# Patient Record
Sex: FEMALE | Race: WHITE | HISPANIC OR LATINO | Employment: FULL TIME | URBAN - METROPOLITAN AREA
[De-identification: names, ages, dates, MRNs, and addresses within clinical notes are randomized per-mention and may not be internally consistent; named-entity substitution may affect disease eponyms.]

---

## 2017-01-05 ENCOUNTER — GENERIC CONVERSION - ENCOUNTER (OUTPATIENT)
Dept: OTHER | Facility: OTHER | Age: 27
End: 2017-01-05

## 2017-08-07 ENCOUNTER — ALLSCRIPTS OFFICE VISIT (OUTPATIENT)
Dept: OTHER | Facility: OTHER | Age: 27
End: 2017-08-07

## 2017-08-07 DIAGNOSIS — E03.9 HYPOTHYROIDISM: ICD-10-CM

## 2017-09-05 ENCOUNTER — GENERIC CONVERSION - ENCOUNTER (OUTPATIENT)
Dept: OTHER | Facility: OTHER | Age: 27
End: 2017-09-05

## 2017-09-05 LAB
AMBIG ABBREV CMP14 DEFAULT (HISTORICAL): NORMAL
BASOPHILS # BLD AUTO: 0 %
BASOPHILS # BLD AUTO: 0 X10E3/UL (ref 0–0.2)
DEPRECATED RDW RBC AUTO: 14.1 % (ref 12.3–15.4)
EOSINOPHIL # BLD AUTO: 0.2 X10E3/UL (ref 0–0.4)
EOSINOPHIL # BLD AUTO: 2 %
HCT VFR BLD AUTO: 40.2 % (ref 34–46.6)
HGB BLD-MCNC: 13.6 G/DL (ref 11.1–15.9)
IMM.GRANULOCYTES (CD4/8) (HISTORICAL): 0 %
IMM.GRANULOCYTES (CD4/8) (HISTORICAL): 0 X10E3/UL (ref 0–0.1)
LYMPHOCYTES # BLD AUTO: 2.6 X10E3/UL (ref 0.7–3.1)
LYMPHOCYTES # BLD AUTO: 40 %
MCH RBC QN AUTO: 29.8 PG (ref 26.6–33)
MCHC RBC AUTO-ENTMCNC: 33.8 G/DL (ref 31.5–35.7)
MCV RBC AUTO: 88 FL (ref 79–97)
MONOCYTES # BLD AUTO: 0.4 X10E3/UL (ref 0.1–0.9)
MONOCYTES (HISTORICAL): 5 %
NEUTROPHILS # BLD AUTO: 3.3 X10E3/UL (ref 1.4–7)
NEUTROPHILS # BLD AUTO: 53 %
PLATELET # BLD AUTO: 260 X10E3/UL (ref 150–379)
RBC (HISTORICAL): 4.56 X10E6/UL (ref 3.77–5.28)
WBC # BLD AUTO: 6.5 X10E3/UL (ref 3.4–10.8)

## 2017-09-06 ENCOUNTER — GENERIC CONVERSION - ENCOUNTER (OUTPATIENT)
Dept: OTHER | Facility: OTHER | Age: 27
End: 2017-09-06

## 2017-09-06 LAB
A/G RATIO (HISTORICAL): 1.5 (ref 1.2–2.2)
ALBUMIN SERPL BCP-MCNC: 4.6 G/DL (ref 3.5–5.5)
ALP SERPL-CCNC: 50 IU/L (ref 39–117)
ALT SERPL W P-5'-P-CCNC: 11 IU/L (ref 0–32)
AST SERPL W P-5'-P-CCNC: 14 IU/L (ref 0–40)
BILIRUB SERPL-MCNC: 0.3 MG/DL (ref 0–1.2)
BUN SERPL-MCNC: 13 MG/DL (ref 6–20)
BUN/CREA RATIO (HISTORICAL): 13 (ref 9–23)
CALCIUM SERPL-MCNC: 9.5 MG/DL (ref 8.7–10.2)
CHLORIDE SERPL-SCNC: 100 MMOL/L (ref 96–106)
CO2 SERPL-SCNC: 23 MMOL/L (ref 18–29)
CREAT SERPL-MCNC: 0.98 MG/DL (ref 0.57–1)
EGFR AFRICAN AMERICAN (HISTORICAL): 92 ML/MIN/1.73
EGFR-AMERICAN CALC (HISTORICAL): 80 ML/MIN/1.73
GLUCOSE SERPL-MCNC: 81 MG/DL (ref 65–99)
POTASSIUM SERPL-SCNC: 4.7 MMOL/L (ref 3.5–5.2)
SODIUM SERPL-SCNC: 138 MMOL/L (ref 134–144)
TOT. GLOBULIN, SERUM (HISTORICAL): 3 G/DL (ref 1.5–4.5)
TOTAL PROTEIN (HISTORICAL): 7.6 G/DL (ref 6–8.5)
TSH SERPL DL<=0.05 MIU/L-ACNC: 16.42 UIU/ML (ref 0.45–4.5)

## 2017-10-18 DIAGNOSIS — E03.9 HYPOTHYROIDISM: ICD-10-CM

## 2018-01-04 ENCOUNTER — ALLSCRIPTS OFFICE VISIT (OUTPATIENT)
Dept: OTHER | Facility: OTHER | Age: 28
End: 2018-01-04

## 2018-01-05 LAB — PAP (HISTORICAL): NORMAL

## 2018-01-05 NOTE — PROGRESS NOTES
Assessment   1  Encounter for gynecological examination without abnormal finding (V72 31) (Z01 419)   2  Encounter for surveillance of contraceptive pills (V25 41) (Z30 41)   3  Encounter for preconception consultation (V26 49) (Z31 69)   4  Encounter for Pap smear of cervix with HPV DNA cotesting (V76 2) (Z12 4)   5  Hypothyroidism (244 9) (E03 9)   6  Sunburn due to tanning bed (692 82) (L56 8,W89  9XXA)    Plan   Encounter for gynecological examination without abnormal finding    · (B) PAP (REFLEX TO HPV PLUS WHEN ASC-US); Status: In Progress - Specimen/Data    Collected,Retrospective By Protocol Authorization;   Done: 36OWK7044   Perform:BioReference; HQF:[id number]; Last Updated Cathy Shiela; 1/4/2018 2:49:49 PM;Ordered;For:Encounter for gynecological examination without abnormal finding; Ordered By:Linda Sepulveda;  : 12/14/2017  Encounter for surveillance of contraceptive pills    · Norgestim-Eth Estrad Triphasic 0 18/0 215/0 25 MG-25 MCG Oral Tablet (Ortho    Tri-Cyclen Lo); TAKE 1 TABLET DAILY   Rx By: Ngoc Nails; Dispense: 28 Days ; #:1 X 28 Tablet Disp Pack; Refill: 0;For: Encounter for surveillance of contraceptive pills; MARY GRACE = N; Sent To: [facility]    Discussion/Summary   health maintenance visit healthy adult female the risks and benefits of cervical cancer screening were discussed Pap test with reflex HPV testing was done today cervical cancer screening is needed every three years Testing was done today for Denies STI concerns today  Breast cancer screening: monthly self breast exam was advised and breast cancer screening is not indicated  Colorectal cancer screening: colorectal cancer screening is not indicated  Screening lab work includes Thyroid function managed by PCP  Advice and education were given regarding reproductive health, contraception, sunscreen use and self skin examination  Patient discussion: discussed with the patient         1 - GENERAL CONCEPTION ADVICE methods of optimizing natural fertility  When to have intercourse & how to detect ovulation were all reviewed  They are going to start TTC in about 1 month  She was advised today to start a prenatal vitamin immediately  Expect ovulation to occur shortly after discontinuing birth control pill  - TANNING BED USE against this due to concern for skin cancer  The patient has the current Goals: Maintain health  The patent has the current Barriers: None  Patient is able to Self-Care  Possible side effects of new medications were reviewed with the patient/guardian today  The treatment plan was reviewed with the patient/guardian  The patient/guardian understands and agrees with the treatment plan       Self Referrals: Yes      Chief Complaint   Pt is here for yearly exam       History of Present Illness   HPI: Taylor Meneses is a 17yo para 1 female here today for annual GYN exam  She is on OCPs (denies ACHES) and enjoys them  Wants 1 more months' refill and then she and her  are going to start TTC a second child  Their son is 2yo ( delivered by [facility])  Works as an   Denies breast, bowel, or bladder concerns  No hx of abnormal pap smears  PMHx significant for hypothyroidism dx'd at age 25  Going to be celebrating her 5-year wedding anniversary soon in [facility]         GYN , Adult Female Emily Kelsey: The patient is being seen for a health maintenance and gynecology evaluation  The last health maintenance visit was 1 year(s) ago  Social History: Household members include spouse-- and-- 1 son(s)  She is   Work status: working full time-- and-- occupation:   The patient has never smoked cigarettes  General Health:      Lifestyle:  She exercises regularly  -- She does not use tobacco -- She consumes alcohol -- She denies drug use  Reproductive health:  she reports no menstrual problems        Menstrual history: LMP: the last menstrual period was 2017 -- she uses contraception  For contraception, she uses oral contraception pills  -- she is sexually active  She is monogamous with a male partner  -- pregnancy history: G 1P 1  Screening: cancer screening reviewed and updated  Cervical cancer screening includes a pap smear performed 3/27/2014, negative  Breast cancer screening includes a clinical breast exam performed 2016  Review of Systems   no vaginal pain,-- no vaginal discharge,-- no vaginal itching,-- no vaginal odor,-- did not miss the most recent period,-- not thinking she may be pregnant,-- periods are regular,-- regular length of periods-- and-- no dysuria  Constitutional: No fever, no chills, feels well, no tiredness, no recent weight gain or loss  Breasts: no complaints of breast pain, breast lump or nipple discharge  Gastrointestinal: no complaints of abdominal pain, no constipation, no nausea or diarrhea, no vomiting, no bloody stools  Genitourinary: no complaints of dysuria, no incontinence, no pelvic pain, no dysmenorrhea, no vaginal discharge or abnormal vaginal bleeding-- and-- as noted in HPI  ROS reviewed  Active Problems   1  Encounter for gynecological examination without abnormal finding (V72 31) (Z01 419)   2  Hypothyroidism (244 9) (E03 9)   3   Irregular menstrual cycle (626 4) (N92 6)    Past Medical History    · History of Breakthrough bleeding (626 6) (N92 1)   · History of Breech presentation (652 20) (O32 1XX0)   · History of Contraception (V25 9) (Z30 9)   · History of candidiasis (V12 09) (Z86 19)   · History of migraine (V12 49) (Z86 69)   · History of muscle pain (V13 59) (Z87 39)   · History of thyroid disease (V12 29) (Z86 39)   · History of varicella (V12 09) (Z86 19)   · History of Pregnancy related leg pain, antepartum (048 86,360 0) (O26 899,M79 609)   · History of Screening for lipoid disorders (V77 91) (Z13 220)   · History of Skin rash (782 1) (R21)   · Suspected problem with fetal growth not found (V89 04) (Z03 74)   · History of Thyroid dysfunction in pregnancy, antepartum (188 81,974 5) (T64 119,K11  9)   · History of URI, acute (465 9) (J06 9)   · History of Urinary Tract Infection (V13 02)   · History of Vaginitis (616 10) (N76 0)     The active problems and past medical history were reviewed and updated today  Surgical History    · History of Oral Surgery Tooth Extraction     The surgical history was reviewed and updated today  Family History   Mother    · Family history of hypertension (V17 49) (Z82 49)  Family History    · Family history of Anemia (V18 2)   · Family history of Arthritis (V17 7)   · Family history of Diabetes Mellitus (V18 0)   · Family history of Hypertension (V17 49)   · Family history of Renal Disease   · Family history of Reported Family History Of Heart Disease     The family history was reviewed and updated today  Social History    · Being A Social Drinker   · Caffeine Use   · Exercising Erratically   · Marital History - Currently    · Never A Smoker   · Denied: History of Never Used Drugs   · Uses Safety Equipment - Seatbelts    Current Meds    1  Levothyroxine Sodium 175 MCG Oral Tablet; Take 1 tablet daily; Therapy: 99DIN0650 to (Last Rx:54Gcz7716)  Requested for: 24Kra2718 Ordered   2  Norgestim-Eth Estrad Triphasic 0 18/0 215/0 25 MG-25 MCG Oral Tablet; sig 1 tablet     daily; Therapy: 82SBP3849 to (Evaluate:22Jan2018)  Requested for: 02Jht4491; Last     Rx:83Dzd1460 Ordered    Allergies   1  No Known Drug Allergies  2  Seasonal    Vitals    Recorded: 49YHH2425 64:51YX   Systolic 694, LUE, Sitting   Diastolic 70, LUE, Sitting   Height 5 ft 4 in   Weight 157 lb    BMI Calculated 26 95   BSA Calculated 1 76   LMP 47Iyk4963       BP and BMI appropriate for continued AMADOU use  Physical Exam        Constitutional      General appearance: No acute distress, well appearing and well nourished         Neck      Neck: Normal, supple, trachea midline, no masses  Thyroid: Normal, no thyromegaly  Pulmonary      Respiratory effort: No increased work of breathing or signs of respiratory distress  Auscultation of lungs: Clear to auscultation  Cardiovascular      Auscultation of heart: Normal rate and rhythm, normal S1 and S2, no murmurs  Genitourinary      External genitalia: Normal and no lesions appreciated  Vagina: Normal, no lesions or dryness appreciated  Urethra: Normal        Urethral meatus: Normal        Bladder: Normal, soft, non-tender and no prolapse or masses appreciated  Cervix: Normal, no palpable masses  Examination of the cervix revealed normal findings  A Pap smear was performed  Bimanual exam findings include no cervical motion tenderness  Uterus: Normal, non-tender, not enlarged, and no palpable masses  Adnexa/parametria: Normal, non-tender and no fullness or masses appreciated  Chest      Breasts: Normal and no dimpling or skin changes noted  Abdomen      Abdomen: Normal, non-tender, and no organomegaly noted  Skin      Skin and subcutaneous tissue: Abnormal  -- Sunburn on torso  Was at a tanning parlor earlier today        Psychiatric      Orientation to person, place, and time: Normal        Mood and affect: Normal        Signatures    Electronically signed by : [facility]; Jan 4 2018  3:12PM EST                       (Author)     Electronically signed by : SRINIVAS Krueger ; Jan 4 2018  3:31PM EST                       (Author)

## 2018-01-11 NOTE — MISCELLANEOUS
Message   Recorded as Task   Date: 03/30/2016 02:44 PM, Created By: Roxanne Vitale   Task Name: Med Renewal Request   Assigned To: Felix Ramirez   Regarding Patient: Yen Coley, Status: Active   CommentStanford Oliveira - 30 Mar 2016 2:44 PM     TASK CREATED  Caller: Self; Renew Medication; [phone] (Home)  please cancel prescription at [facility] to Now go to [address] [place] [address]  [place]  She had 4 refills left and needs to refill by the weekend  Please call her when the script is moved   Ledora Graft - 30 Mar 2016 3:19 PM     TASK EDITED  called pharm they will deactivate the rx, sent to new pharm        Active Problems    1  Breakthrough bleeding (626 6) (N92 1)   2  Candidiasis (112 9) (B37 9)   3  Hypothyroidism (244 9) (E03 9)   4  Irregular menstrual cycle (626 4) (N92 6)   5  Myalgia (729 1) (M79 1)    Current Meds   1  Fluconazole 150 MG Oral Tablet (Diflucan); take one tablet today and one in 3 days; Therapy: 42GXT3837 to (Liudmila Palencia)  Requested for: 66GCF6534; Last   Rx:02Mar2016 Ordered   2  Levothyroxine Sodium 150 MCG Oral Tablet; TAKE 1 TABLET DAILY; Therapy: 39WKR1047 to (Isis Price)  Requested for: 68FXW0022; Last   Rx:03Mar2016 Ordered   3  Minastrin 24 Fe 1-20 MG-MCG(24) Oral Tablet Chewable; Take 1 tablet daily; Therapy: 44XWS2746 to (Evaluate:60Kee1283)  Requested for: 11DTQ2447; Last   Rx:18Flb5771 Ordered   4  Ortho Tri-Cyclen Lo 0 18/0 215/0 25 MG-25 MCG Oral Tablet; Take 1 tablet daily; Therapy: 47NLE8249 to (Evaluate:00Bst5487)  Requested for: 82LWG0788; Last   Rx:92Ipz3717 Ordered    Allergies    1  No Known Drug Allergies    2  Seasonal    Plan  Breakthrough bleeding    · Ortho Tri-Cyclen Lo 0 18/0 215/0 25 MG-25 MCG Oral Tablet (Norgestim-Eth  Estrad Triphasic);  Take 1 tablet daily    Signatures   Electronically signed by : Theresa Hall, ; Mar 30 2016  3:20PM EST                       (Author)

## 2018-01-11 NOTE — MISCELLANEOUS
Message   Recorded as Task   Date: 03/02/2016 09:42 AM, Created By: Prachi Workman   Task Name: Call Back   Assigned To: Keri Thomas   Regarding Patient: Ulysses Pardon, Status: In Progress   Comment:    Prachi Workman - 02 Mar 2016 9:42 AM     TASK CREATED  Caller: Self; General Medical Question; [phone] (Home)  pt called - questions on the [facility] SYSTEM she's on - Ortho Tri-Cyclen - having issues on the last week of the pills - she was to get her period with the last week but hasn't gotten it yet - but she did spot - ALSO, having major itching at night (losing sleep)- not sure issue - previous had BV with dryness - it's not dry any longer but the itching is very bad  She's at work now - if she doesn't answer - she's asking that you call her right back - she will excuse herself &  on the 2nd call - Please advise  Guevara 86 Mar 2016 10:07 AM     TASK IN PROGRESS   Lita Amador - 88 Mar 2016 10:09 AM     TASK EDITED  lmtcb   Lita Amador - 37 Mar 2016 10:23 AM     TASK EDITED  Pt to stay on ocs - takes 3 mos to adjust to new pill  Pt has yeast sx - itch, dsch, no odor  To take diflucan ( to ehr) If no better in 1 week - ov        Active Problems    1  Breakthrough bleeding (626 6) (N92 1)   2  Candidiasis (112 9) (B37 9)   3  Hypothyroidism (244 9) (E03 9)   4  Irregular menstrual cycle (626 4) (N92 6)   5  Myalgia (729 1) (M79 1)    Current Meds   1  Levothyroxine Sodium 125 MCG Oral Tablet; TAKE 1 TABLET DAILY AS DIRECTED; Therapy: 46QCF6751 to (Evaluate:11Nrw1051)  Requested for: 21Jan2016; Last   Rx:21Jan2016 Ordered   2  Minastrin 24 Fe 1-20 MG-MCG(24) Oral Tablet Chewable; Take 1 tablet daily; Therapy: 70TTO5521 to (Evaluate:32Tdb9096)  Requested for: 64RGZ0393; Last   Rx:15Jun2015 Ordered   3  Ortho Tri-Cyclen Lo 0 18/0 215/0 25 MG-25 MCG Oral Tablet (Norgestim-Eth Estrad   Triphasic); Take 1 tablet daily;    Therapy: 13WHD6881 to (Evaluate:94Huu5706) Requested for: 80IDC9413; Last   Rx:08Aye1685 Ordered    Allergies    1  No Known Drug Allergies    2  Seasonal    Plan  Candidiasis    · Fluconazole 150 MG Oral Tablet (Diflucan); take one tablet today and one in 3  days    Signatures   Electronically signed by :  Martell Gonzalez, ; Mar  2 2016 10:24AM EST                       (Author)

## 2018-01-12 NOTE — MISCELLANEOUS
Message   Date: 29 Jul 2016 9:04 PM EST, Recorded By: Sirena Abdul For: Ritchie Carr, Luis A   Phone: [phone] (Home), [phone] (Work)   Reason: Medical Complaint   Answering service 7/29/2016 9:04 pm   Needs on-call for tingly fingers and hands, numbness in right side of cheek after eating homemade pickle  Plan  Patient already on her way to urgent care  Discussion/Summary  22year old female with a history of migraines spoke to me about numbness and tingling feeling in her hands/fingers  Symptoms lasted for about 15 minutes  She noticed symptoms after eating a pickle  Never had symptoms like this before  No focal weakness  No speech difficulties or swallowing difficulties  This was the first thing she ate all day since she was very busy at work and didn't get to eat  Afterwards she noticed she was starting to develop a headache in the back of her head  Her right arm was starting to feel a little bit heavy as well  She was concerned on already on her way to urgent care  I agreed that she should be further evaluation in urgent care or emergency department setting  All questions were answered       Signatures   Electronically signed by : Josesito Beyer DO; Aug  1 2016 10:29AM EST                       (Author)

## 2018-01-13 NOTE — PROGRESS NOTES
Assessment    1  Breakthrough bleeding (626 6) (N92 1)    Plan  Breakthrough bleeding    · Ortho Tri-Cyclen Lo 0 18/0 215/0 25 MG-25 MCG Oral Tablet (Norgestim-Eth Estrad  Triphasic); Take 1 tablet daily   Rx By: Jeanine Ku; Dispense: 28 Days ; #:1 X 28 Tablet Disp Pack; Refill: 5; For: Breakthrough bleeding; MARY GRACE = N; Sent To: [id number]    Discussion/Summary  Contraception: Impression: contraception management  The goals of contraception include pregnancy prevention and cycle control  Currently, the patient has symptoms that are not controlled  Medication management includes discontinuing monophasic oral contraceptives and beginning triphasic oral contraceptives  Medication changes are as documented in orders  Patient discussion: 10 minute visit, greater than half of the time was spent on counseling  Discussion Summary:   Patient complains of breakthrough bleeding and also sometimes missing her periods on the placebo  Was hoping the birth control pill control her cycle  We'll change to multiphasic pill  So the patient did get to 3 cycles before she decides if it's working  Chief Complaint  Chief Complaint Free Text Note Form: Pt is here to discus other contraceptive options  She is currently on Minastrin but her menses still are not regular  This is her second BC that she tried which has made her dry and itchy and intercourse painful  She tried Premarin in the past for this which helped  She believes that the [facility] SYSTEM is the culprit  History of Present Illness  HPI: 51-year-old here today complaining of breakthrough bleeding on the Minastrin  She denies missing pills  Active Problems    1  Contraception (V25 9) (Z30 9)   2  Encounter for routine gynecological examination (V72 31) (Z01 419)   3  Flu vaccine need (V04 81) (Z23)   4  Hypothyroidism (244 9) (E03 9)   5  Irregular menstrual cycle (626 4) (N92 6)   6  Screening for lipoid disorders (V77 91) (Z13 220)   7   Thyroid dysfunction in pregnancy, antepartum (648 13,246 9) (O99 280,E07 9)   8  URI, acute (465 9) (J06 9)    Past Medical History    1  History of Breech presentation (652 20) (O32 1XX0)   2  History of migraine (V12 49) (Z86 69)   3  History of thyroid disease (V12 29) (Z86 39)   4  History of varicella (V12 09) (Z86 19)   5  History of Maternal obesity, antepartum (649 13,278 00) (O99 210,E66 9)   6  History of Pregnancy related leg pain, antepartum (346 11,321 3) (O26 899,M79 609)   7  Suspected problem with fetal growth not found (V89 04) (Z03 74)   8  History of Urinary Tract Infection (V13 02)   9  History of Vaginitis (616 10) (N76 0)    Surgical History    1  History of Oral Surgery Tooth Extraction    Family History    1  Family history of hypertension (V17 49) (Z82 49)    2  Family history of Anemia (V18 2)   3  Family history of Arthritis (V17 7)   4  Family history of Diabetes Mellitus (V18 0)   5  Family history of Hypertension (V17 49)   6  Family history of Renal Disease   7  Family history of Reported Family History Of Heart Disease    Social History    · Being A Social Drinker   · Caffeine Use   · Exercising Erratically   · Marital History - Currently    · Never A Smoker   · Denied: History of Never Used Drugs   · Stopped Drinking Alcohol   · Uses Safety Equipment - Seatbelts    Current Meds   1  Levothyroxine Sodium 125 MCG Oral Tablet; TAKE 1 TABLET DAILY AS DIRECTED; Therapy: 77KCI2076 to (Evaluate:18Qbc1414)  Requested for: 21Jan2016; Last   Rx:21Jan2016 Ordered   2  Minastrin 24 Fe 1-20 MG-MCG(24) Oral Tablet Chewable; Take 1 tablet daily; Therapy: 01NNX0923 to (Evaluate:87Nms9104)  Requested for: 61BHI8896; Last   Rx:15Jun2015 Ordered    Allergies    1  No Known Drug Allergies    2   Seasonal    Future Appointments    Date/Time Provider Specialty Site   06/20/2016 01:00 PM Lola Camarillo MD Obstetrics/Gynecology [facility]     Signatures   Electronically signed by : Lokesh Corcoran CNM; Feb 4 2016 10:25AM EST                       (Author)    Electronically signed by : Lokesh Corcoran CNM;  Feb 4 2016 10:25AM EST                       (Author)    Electronically signed by : SRINIVAS Buckley ; Feb 4 2016  2:30PM EST

## 2018-01-14 NOTE — MISCELLANEOUS
Message   Recorded as Task   Date: 10/31/2016 02:28 PM, Created By: Geovanni Alvarado   Task Name: Care Coordination   Assigned To: Feroz Castro   Regarding Patient: Cole Guerrero, Status: In Progress   Comment:    Dennise Gomez - 31 Oct 2016 2:28 PM     TASK CREATED  Caller: Self; Care Coordination; [phone] (Mobile Phone)  Pt has a questions in regards to her birth control pills  [facility] - 31 Oct 2016 2:38 PM     TASK IN PROGRESS   [facility] - 31 Oct 2016 3:51 PM     TASK EDITED    pt on week 3 saturday-took one yesterday- 3 hours late  - did forget to take another pill during the pack as well, has had unprotected sex-on saturday  advised if pregnancy was not desired to take plan b as soon as possible and stop her bc then restart when she gets her period  Active Problems    1  Breakthrough bleeding (626 6) (N92 1)   2  Contraceptive use education (V25 09) (Z30 09)   3  Hypothyroidism (244 9) (E03 9)   4  Irregular menstrual cycle (626 4) (N92 6)   5  Myalgia (729 1) (M79 1)   6  Skin rash (782 1) (R21)    Current Meds   1  Levothyroxine Sodium 150 MCG Oral Tablet; TAKE 1 TABLET DAILY; Therapy: 83DMM4594 to (STQXUXOC:17SJI2233)  Requested for: 28Jun2016; Last   Rx:28Jun2016 Ordered   2  Norgestim-Eth Estrad Triphasic 0 18/0 215/0 25 MG-25 MCG Oral Tablet (Ortho   Tri-Cyclen Lo); sig 1 tablet daily; Therapy: 15ANZ4266 to (Last Rx:70Onk0439)  Requested for: 15Sep2016 Ordered   3  Nystatin-Triamcinolone 743656-3 1 UNIT/GM-% External Cream; APPLY SPARINGLY TO   AFFECTED AREA(S) 3 TIMES A DAY; Therapy: 96OTG4216 to (Complete:12Nov2016)  Requested for: 29Oct2016; Last   Rx:29Oct2016 Ordered    Allergies    1  No Known Drug Allergies    2   Seasonal    Signatures   Electronically signed by : Shahana Choi LPN; Oct 31 2251  1:34PR EST                       (Author)

## 2018-01-14 NOTE — MISCELLANEOUS
Message   Recorded as Task   Date: 09/15/2016 09:42 AM, Created By: Christiana Glasgow   Task Name: Med Renewal Request   Assigned To: Kandy Bear   Regarding Patient: Itz Mercado, Status: In Progress   Comment:    Dennise Gomez - 15 Sep 2016 9:42 AM     TASK CREATED  Caller: Self; Renew Medication; [phone] (Home)  Pt called and need a refill on her birth control  she can be reached@ home   [facility] - 15 Sep 2016 9:55 AM     TASK IN PROGRESS   [facility] - 15 Sep 2016 9:57 AM     TASK EDITED                 rx sent to pharmacy  Active Problems    1  Breakthrough bleeding (626 6) (N92 1)   2  Candidiasis (112 9) (B37 9)   3  Contraceptive use education (V25 09) (Z30 09)   4  Encounter for routine gynecological examination (V72 31) (Z01 419)   5  Hypothyroidism (244 9) (E03 9)   6  Irregular menstrual cycle (626 4) (N92 6)   7  Myalgia (729 1) (M79 1)    Current Meds   1  Levothyroxine Sodium 150 MCG Oral Tablet; TAKE 1 TABLET DAILY; Therapy: 36QSB3763 to (EAKPMITL:30FXO9899)  Requested for: 28Jun2016; Last   Rx:28Jun2016 Ordered   2  Norgestim-Eth Estrad Triphasic 0 18/0 215/0 25 MG-25 MCG Oral Tablet; sig 1 tablet   daily; Therapy: 89OVF2822 to (Dianne Maurice)  Requested for: 20Jun2016; Last   Rx:20Jun2016 Ordered    Allergies    1  No Known Drug Allergies    2   Seasonal    Plan  Breakthrough bleeding, Contraceptive use education    · Norgestim-Eth Estrad Triphasic 0 18/0 215/0 25 MG-25 MCG Oral Tablet (Ortho  Tri-Cyclen Lo); sig 1 tablet daily    Signatures   Electronically signed by : Nahomy Angeles LPN; Sep 15 5663  2:89RT EST                       (Author)

## 2018-01-15 VITALS
HEIGHT: 64 IN | HEART RATE: 74 BPM | SYSTOLIC BLOOD PRESSURE: 106 MMHG | DIASTOLIC BLOOD PRESSURE: 56 MMHG | BODY MASS INDEX: 27.31 KG/M2 | WEIGHT: 160 LBS | OXYGEN SATURATION: 99 %

## 2018-01-16 NOTE — RESULT NOTES
Verified Results  (1) CBC/PLT/DIFF [id number] 12:56PM Prior Knowledge     Test Name Result Flag Reference   WBC 6 5 x10E3/uL  3 4-10 8   RBC 4 56 x10E6/uL  3 77-5 28   Hemoglobin 13 6 g/dL  11 1-15 9   Hematocrit 40 2 %  34 0-46  6   MCV 88 fL  79-97   MCH 29 8 pg  26 6-33 0   MCHC 33 8 g/dL  31 5-35 7   RDW 14 1 %  12 3-15 4   Platelets 305 N84D0/DH  150-379   Neutrophils 53 %     Lymphs 40 %     Monocytes 5 %     Eos 2 %     Basos 0 %     Neutrophils (Absolute) 3 3 x10E3/uL  1 4-7 0   Lymphs (Absolute) 2 6 x10E3/uL  0 7-3 1   Monocytes(Absolute) 0 4 x10E3/uL  0 1-0 9   Eos (Absolute) 0 2 x10E3/uL  0 0-0 4   Baso (Absolute) 0 0 x10E3/uL  0 0-0 2   Immature Granulocytes 0 %     Immature Grans (Abs) 0 0 x10E3/uL  0 0-0 1     (1) COMPREHENSIVE METABOLIC PANEL [id number] 83:51VQ Prior Knowledge     Test Name Result Flag Reference   Glucose, Serum 81 mg/dL  65-99   BUN 13 mg/dL  6-20   Creatinine, Serum 0 98 mg/dL  0 57-1 00   BUN/Creatinine Ratio 13  9-23   Sodium, Serum 138 mmol/L  134-144   Potassium, Serum 4 7 mmol/L  3 5-5 2   Chloride, Serum 100 mmol/L     Carbon Dioxide, Total 23 mmol/L  18-29   Calcium, Serum 9 5 mg/dL  8 7-10 2   Protein, Total, Serum 7 6 g/dL  6 0-8 5   Albumin, Serum 4 6 g/dL  3 5-5 5   Globulin, Total 3 0 g/dL  1 5-4 5   A/G Ratio 1 5  1 2-2 2   Bilirubin, Total 0 3 mg/dL  0 0-1 2   Alkaline Phosphatase, S 50 IU/L     AST (SGOT) 14 IU/L  0-40   ALT (SGPT) 11 IU/L  0-32   eGFR If NonAfricn Am 80 mL/min/1 73  >59   eGFR If Africn Am 92 mL/min/1 73  >59     (1) TSH [id number] 12:56PM [facility]     Test Name Result Flag Reference   TSH 16 420 uIU/mL H 0 450-4 500     [facility] C[facility]14 Default [id number] 12:56PM [facility]     Test Name Result Flag Reference   Adrianna Nowak CMP14 Default Comment     A hand-written panel/profile was received from your office   In  accordance with the [facility] Ambiguous Test Code Policy dated July 7370, we have completed your order by using the closest currently  or formerly recognized AMA panel  We have assigned Comprehensive  Metabolic Panel (14), Test Code #[id number] to this request   If this  is not the testing you wished to receive on this specimen, please  contact the [address] [place] [address] Client Inquiry/Technical Services Department  to clarify the test order  We appreciate your business

## 2018-01-22 VITALS — TEMPERATURE: 97.8 F

## 2018-01-23 VITALS
DIASTOLIC BLOOD PRESSURE: 70 MMHG | WEIGHT: 157 LBS | SYSTOLIC BLOOD PRESSURE: 110 MMHG | BODY MASS INDEX: 26.8 KG/M2 | HEIGHT: 64 IN

## 2018-03-06 ENCOUNTER — OFFICE VISIT (OUTPATIENT)
Dept: OBGYN CLINIC | Facility: MEDICAL CENTER | Age: 28
End: 2018-03-06
Payer: COMMERCIAL

## 2018-03-06 VITALS — SYSTOLIC BLOOD PRESSURE: 98 MMHG | WEIGHT: 158 LBS | BODY MASS INDEX: 27.12 KG/M2 | DIASTOLIC BLOOD PRESSURE: 56 MMHG

## 2018-03-06 DIAGNOSIS — N91.2 AMENORRHEA: Primary | ICD-10-CM

## 2018-03-06 DIAGNOSIS — E03.9 HYPOTHYROIDISM, UNSPECIFIED TYPE: ICD-10-CM

## 2018-03-06 PROCEDURE — 76801 OB US < 14 WKS SINGLE FETUS: CPT | Performed by: PHYSICIAN ASSISTANT

## 2018-03-06 PROCEDURE — 99213 OFFICE O/P EST LOW 20 MIN: CPT | Performed by: PHYSICIAN ASSISTANT

## 2018-03-06 RX ORDER — LEVOTHYROXINE SODIUM 175 UG/1
1 TABLET ORAL DAILY
COMMUNITY
Start: 2014-10-06 | End: 2018-04-10 | Stop reason: SDUPTHER

## 2018-03-06 NOTE — PROGRESS NOTES
Early OB Ultrasound Procedure Note  Dario Perez  YOB: 1990    Technician: Study performed by the interpreting physician assistant    Indications:  amenorrhea     Patient's last menstrual period was 01/09/2018 (approximate)  , Patient was on continuous OCP October-January and stopped OCP 1/20/18 to try to conceive  Negative pregnancy test beginning of February, (+) HPT yesterday    Patient denies vaginal bleeding or abdominal pain    Procedure Details  Small gestational sac in fundus 0 23cm, no fetal pole or cardiac activity  Description of fetal anatomy Normal  Description of ovaries: normal  Description of uterus: normal    Findings:  Early IUP, RTO 3 weeks for repeat US at later gestational date  Given rx for TSH due to h/o hypothyroidism and patient admits hasn't been checked in a while  Call with any heavy vaginal bleeding or abdominal pain

## 2018-03-07 NOTE — PROGRESS NOTES
History of Present Illness    Revaccination   Vaccine Information: Vaccine(s) Given (names): [id number]  Spoke with patient regarding vaccine out of temperature range and risks and benefits of revaccination  Action(s): Pt will be revaccinated  Appointment scheduled: [id number] [id number]   Pt contacted and will call back  Pt called (attempt 1): [phone]  Other Information: pt  would like to discus w/  and will call us back for appt /sf  Revaccination Completed: [id number]  Active Problems    1  Breakthrough bleeding (626 6) (N92 1)   2  Contraceptive use education (V25 09) (Z30 09)   3  Encounter for routine gynecological examination (V72 31) (Z01 419)   4  Hypothyroidism (244 9) (E03 9)   5  Irregular menstrual cycle (626 4) (N92 6)   6  Myalgia (729 1) (M79 1)   7  Need for revaccination (V05 9) (Z23)   8  Skin rash (782 1) (R21)    Immunizations  Influenza --- Trena Roberts: 30-Sep-2014   Tdap --- Series1: 30-Sep-2014     Current Meds   1  Levothyroxine Sodium 150 MCG Oral Tablet; TAKE 1 TABLET DAILY   2  Norgestim-Eth Estrad Triphasic 0 18/0 215/0 25 MG-25 MCG Oral Tablet; sig 1 tablet   daily    Allergies    1  No Known Drug Allergies    2   Seasonal    Signatures   Electronically signed by : SRINIVAS James ; Jan 9 2017  6:15PM EST

## 2018-03-26 ENCOUNTER — OFFICE VISIT (OUTPATIENT)
Dept: OBGYN CLINIC | Facility: MEDICAL CENTER | Age: 28
End: 2018-03-26
Payer: COMMERCIAL

## 2018-03-26 VITALS — SYSTOLIC BLOOD PRESSURE: 112 MMHG | BODY MASS INDEX: 27.81 KG/M2 | DIASTOLIC BLOOD PRESSURE: 66 MMHG | WEIGHT: 162 LBS

## 2018-03-26 DIAGNOSIS — N91.2 AMENORRHEA: Primary | ICD-10-CM

## 2018-03-26 PROCEDURE — 76801 OB US < 14 WKS SINGLE FETUS: CPT | Performed by: PHYSICIAN ASSISTANT

## 2018-03-26 NOTE — PROGRESS NOTES
Early OB Ultrasound Procedure Note  Nicki Howard  YOB: 1990    Technician: Study performed by the interpreting physician assistant    Indications:  amenorrhea   /66 (BP Location: Right arm, Patient Position: Sitting, Cuff Size: Standard)   Wt 73 5 kg (162 lb)   LMP 01/09/2018 (Exact Date)   Breastfeeding? No   BMI 27 81 kg/m²     Patient's last menstrual period was 01/09/2018 (exact date)  , , with EGA of  10 weeks and 6   Days by LMP    Patient denies vaginal bleeding or brown discharge      Procedure Details  A gestational sac is seen containing a yolk sac and a bradshaw embryo  The embryonic crown-rump length measures 1 34 cm  and calculates to an estimated gestational age of 9 weeks and 4   days  Embryonic cardiac activity is  present  @ 156  Description of fetal anatomy Normal    Description of ovaries: normal   Description of uterus: normal    Findings:  Viable, bradshaw intrauterine pregnancy at 7 weeks,  4 days, with a final EDC of November 8, 2018 by todays US

## 2018-03-26 NOTE — ASSESSMENT & PLAN NOTE
(+) viable IUP measuring 7w4d by todays US with (+) FHR of 156   Final DARYL of 11/8/18 by todays Us   RTO 3 wks for PN interview and 5 wks for PN1

## 2018-04-09 ENCOUNTER — INITIAL PRENATAL (OUTPATIENT)
Dept: OBGYN CLINIC | Facility: MEDICAL CENTER | Age: 28
End: 2018-04-09

## 2018-04-09 ENCOUNTER — APPOINTMENT (OUTPATIENT)
Dept: LAB | Facility: MEDICAL CENTER | Age: 28
End: 2018-04-09
Payer: COMMERCIAL

## 2018-04-09 VITALS
HEIGHT: 64 IN | WEIGHT: 161.2 LBS | SYSTOLIC BLOOD PRESSURE: 110 MMHG | BODY MASS INDEX: 27.52 KG/M2 | RESPIRATION RATE: 14 BRPM | DIASTOLIC BLOOD PRESSURE: 70 MMHG

## 2018-04-09 DIAGNOSIS — E03.9 HYPOTHYROIDISM, UNSPECIFIED TYPE: ICD-10-CM

## 2018-04-09 DIAGNOSIS — Z34.91 FIRST TRIMESTER PREGNANCY: ICD-10-CM

## 2018-04-09 DIAGNOSIS — Z34.91 FIRST TRIMESTER PREGNANCY: Primary | ICD-10-CM

## 2018-04-09 PROCEDURE — 80081 OBSTETRIC PANEL INC HIV TSTG: CPT

## 2018-04-09 PROCEDURE — 84439 ASSAY OF FREE THYROXINE: CPT

## 2018-04-09 PROCEDURE — 84443 ASSAY THYROID STIM HORMONE: CPT

## 2018-04-09 PROCEDURE — 36415 COLL VENOUS BLD VENIPUNCTURE: CPT

## 2018-04-09 PROCEDURE — 81001 URINALYSIS AUTO W/SCOPE: CPT

## 2018-04-09 PROCEDURE — 87086 URINE CULTURE/COLONY COUNT: CPT

## 2018-04-09 PROCEDURE — OBC: Performed by: OBSTETRICS & GYNECOLOGY

## 2018-04-09 NOTE — PROGRESS NOTES
Patient came to the Ob Intake with her son Lawson Habermann    Patient reports that this was a planned pregnancy   Juanpablo Perez Patient was given a referral to the Maternal fetal medicine for her level ll and was given her prenatal labs and patient plans to go to do the prenatal labs after her visit today also a referral to the dentist was given to the patient   Patient reports that she is doing very well with the pregnancy and happy with the pregnancy  Patient answer the CRAFFT Screening question and score (0)    Patient has an appointment at the maternal Fetal Medicine on Friday 4/13/2018 at the [facility] at 10:30 am

## 2018-04-10 ENCOUNTER — TELEPHONE (OUTPATIENT)
Dept: OBGYN CLINIC | Facility: CLINIC | Age: 28
End: 2018-04-10

## 2018-04-10 ENCOUNTER — TELEPHONE (OUTPATIENT)
Dept: INTERNAL MEDICINE CLINIC | Facility: CLINIC | Age: 28
End: 2018-04-10

## 2018-04-10 DIAGNOSIS — E03.9 ACQUIRED HYPOTHYROIDISM: Primary | ICD-10-CM

## 2018-04-10 LAB
ABO GROUP BLD: NORMAL
BACTERIA UR CULT: NORMAL
BACTERIA UR QL AUTO: NORMAL /HPF
BASOPHILS # BLD AUTO: 0.02 THOUSANDS/ΜL (ref 0–0.1)
BASOPHILS NFR BLD AUTO: 0 % (ref 0–1)
BILIRUB UR QL STRIP: NEGATIVE
BLD GP AB SCN SERPL QL: NEGATIVE
CLARITY UR: CLEAR
COLOR UR: YELLOW
EOSINOPHIL # BLD AUTO: 0.07 THOUSAND/ΜL (ref 0–0.61)
EOSINOPHIL NFR BLD AUTO: 1 % (ref 0–6)
ERYTHROCYTE [DISTWIDTH] IN BLOOD BY AUTOMATED COUNT: 12.9 % (ref 11.6–15.1)
GLUCOSE UR STRIP-MCNC: NEGATIVE MG/DL
HBV SURFACE AG SER QL: NORMAL
HCT VFR BLD AUTO: 35.2 % (ref 34.8–46.1)
HGB BLD-MCNC: 12 G/DL (ref 11.5–15.4)
HGB UR QL STRIP.AUTO: NEGATIVE
HYALINE CASTS #/AREA URNS LPF: NORMAL /LPF
KETONES UR STRIP-MCNC: NEGATIVE MG/DL
LEUKOCYTE ESTERASE UR QL STRIP: NEGATIVE
LYMPHOCYTES # BLD AUTO: 1.93 THOUSANDS/ΜL (ref 0.6–4.47)
LYMPHOCYTES NFR BLD AUTO: 23 % (ref 14–44)
MCH RBC QN AUTO: 29.4 PG (ref 26.8–34.3)
MCHC RBC AUTO-ENTMCNC: 34.1 G/DL (ref 31.4–37.4)
MCV RBC AUTO: 86 FL (ref 82–98)
MONOCYTES # BLD AUTO: 0.56 THOUSAND/ΜL (ref 0.17–1.22)
MONOCYTES NFR BLD AUTO: 7 % (ref 4–12)
NEUTROPHILS # BLD AUTO: 5.69 THOUSANDS/ΜL (ref 1.85–7.62)
NEUTS SEG NFR BLD AUTO: 69 % (ref 43–75)
NITRITE UR QL STRIP: NEGATIVE
NON-SQ EPI CELLS URNS QL MICRO: NORMAL /HPF
NRBC BLD AUTO-RTO: 0 /100 WBCS
PH UR STRIP.AUTO: 6.5 [PH] (ref 4.5–8)
PLATELET # BLD AUTO: 250 THOUSANDS/UL (ref 149–390)
PMV BLD AUTO: 9.2 FL (ref 8.9–12.7)
PROT UR STRIP-MCNC: NEGATIVE MG/DL
RBC # BLD AUTO: 4.08 MILLION/UL (ref 3.81–5.12)
RBC #/AREA URNS AUTO: NORMAL /HPF
RH BLD: POSITIVE
RPR SER QL: NORMAL
RUBV IGG SERPL IA-ACNC: >175 IU/ML
SP GR UR STRIP.AUTO: 1.01 (ref 1–1.03)
SPECIMEN EXPIRATION DATE: NORMAL
T4 FREE SERPL-MCNC: 1.37 NG/DL (ref 0.76–1.46)
TSH SERPL DL<=0.05 MIU/L-ACNC: 0.14 UIU/ML (ref 0.36–3.74)
UROBILINOGEN UR QL STRIP.AUTO: 0.2 E.U./DL
WBC # BLD AUTO: 8.28 THOUSAND/UL (ref 4.31–10.16)
WBC #/AREA URNS AUTO: NORMAL /HPF

## 2018-04-10 RX ORDER — LEVOTHYROXINE SODIUM 0.15 MG/1
150 TABLET ORAL DAILY
Qty: 90 TABLET | Refills: 1 | Status: SHIPPED | OUTPATIENT
Start: 2018-04-10 | End: 2018-07-20 | Stop reason: ALTCHOICE

## 2018-04-10 NOTE — TELEPHONE ENCOUNTER
PT is 10 wks pregnant   She saw her GYN yesterday & had labs done    They sd her TSH is high @ 0 144     And sd to f/u w/Dr Danita Pete to see what she recommends   Also sd to ck her T  4 level    She see's [facility] GYN in [place]    So results should be in Epic

## 2018-04-11 LAB — HIV 1+2 AB+HIV1 P24 AG SERPL QL IA: NORMAL

## 2018-04-13 ENCOUNTER — ULTRASOUND (OUTPATIENT)
Dept: PERINATAL CARE | Facility: CLINIC | Age: 28
End: 2018-04-13
Payer: COMMERCIAL

## 2018-04-13 VITALS
DIASTOLIC BLOOD PRESSURE: 68 MMHG | WEIGHT: 158.4 LBS | HEART RATE: 86 BPM | SYSTOLIC BLOOD PRESSURE: 109 MMHG | BODY MASS INDEX: 27.04 KG/M2 | HEIGHT: 64 IN

## 2018-04-13 DIAGNOSIS — Z53.1 REFUSAL OF BLOOD TRANSFUSIONS AS PATIENT IS JEHOVAH'S WITNESS: ICD-10-CM

## 2018-04-13 DIAGNOSIS — E07.9 THYROID DISEASE AFFECTING PREGNANCY: ICD-10-CM

## 2018-04-13 DIAGNOSIS — Z36.87 ENCOUNTER FOR ANTENATAL SCREENING FOR UNCERTAIN DATES: Primary | ICD-10-CM

## 2018-04-13 DIAGNOSIS — Z3A.10 10 WEEKS GESTATION OF PREGNANCY: ICD-10-CM

## 2018-04-13 DIAGNOSIS — O99.280 THYROID DISEASE AFFECTING PREGNANCY: ICD-10-CM

## 2018-04-13 PROBLEM — IMO0001 REFUSAL OF BLOOD TRANSFUSIONS AS PATIENT IS JEHOVAH'S WITNESS: Status: ACTIVE | Noted: 2018-04-13

## 2018-04-13 PROCEDURE — 76801 OB US < 14 WKS SINGLE FETUS: CPT | Performed by: OBSTETRICS & GYNECOLOGY

## 2018-04-13 PROCEDURE — 99212 OFFICE O/P EST SF 10 MIN: CPT | Performed by: OBSTETRICS & GYNECOLOGY

## 2018-04-13 NOTE — PATIENT INSTRUCTIONS
Thank you for choosing [facility] for your  care today  If you have any questions about your ultrasound or care, please do not hesitate to contact us or your primary obstetrician  Please return in 9-10 weeks for your next ultrasound to check on the fetal anatomy  If you change your mind about wanting genetic screening or testing please let us know

## 2018-04-13 NOTE — PROGRESS NOTES
[address]: Ms Alison Del Toro was seen today at 10w2d for dating ultrasound  See ultrasound report under "OB Procedures" tab  Please don't hesitate to contact our office with any concerns or questions    Brynn Hernandez MD

## 2018-04-25 ENCOUNTER — ROUTINE PRENATAL (OUTPATIENT)
Dept: OBGYN CLINIC | Age: 28
End: 2018-04-25

## 2018-04-25 VITALS — DIASTOLIC BLOOD PRESSURE: 58 MMHG | WEIGHT: 160 LBS | SYSTOLIC BLOOD PRESSURE: 140 MMHG | BODY MASS INDEX: 27.46 KG/M2

## 2018-04-25 DIAGNOSIS — Z34.91 FIRST TRIMESTER PREGNANCY: ICD-10-CM

## 2018-04-25 DIAGNOSIS — Z34.81 PRENATAL CARE, SUBSEQUENT PREGNANCY, FIRST TRIMESTER: Primary | ICD-10-CM

## 2018-04-25 LAB — EXTERNAL HIV-1 ANTIBODY: NORMAL

## 2018-04-25 PROCEDURE — 87591 N.GONORRHOEAE DNA AMP PROB: CPT | Performed by: OBSTETRICS & GYNECOLOGY

## 2018-04-25 PROCEDURE — 87491 CHLMYD TRACH DNA AMP PROBE: CPT | Performed by: OBSTETRICS & GYNECOLOGY

## 2018-04-25 PROCEDURE — PNV: Performed by: OBSTETRICS & GYNECOLOGY

## 2018-04-25 NOTE — PROGRESS NOTES
No complaints  Seen at  Center for dating ultrasound,  Patient is not for in genetic testing she not for sequential screen or quad screen   her  Prenatal labs were normal,  Patient will have every 3 months thyroid testing    Teri Acevedo is here for her first prenatal visit  Age: 32 y o  LMP: Patient's last menstrual period was 2018 (exact date)  Gestational age 13w0d based on LMP No, early [address] Yes    2  [address]         Previous C Section: No  She denies nausea and vomiting  She has had no vaginal bleeding  Patients has no complaints  She  is not planning consultation with maternal fetal medicine for a sequential screen and genetic screening  Allergies: Patient has no known allergies  Medication use :   Current Outpatient Prescriptions   Medication Sig Dispense Refill    levothyroxine 150 mcg tablet Take 1 tablet (150 mcg total) by mouth daily 90 tablet 1    Prenatal MV-Min-Fe Fum-FA-DHA (PRENATAL+DHA PO) Take by mouth       No current facility-administered medications for this visit  She is a non-smoker  In this pregnancy her  medical history is significant for hypothyroidism    Her obstetrical, medical, surgical and family history was reviewed  Her physical exam was normal  A Pap smear was not obtained, Gonorrhea and Chlamydia testing was obtained    Discussed as well during this visit was diet, prenatal vitamins, prenatal visits, lab testing, breast feeding, vaccinations, maternal fetal medicine consultations, prevention of exposure to infectious diseases and toxic chemicals, lifestyle      Risk factors for this pregnancy include: hypothyroidism, Jehowei Witness

## 2018-04-26 LAB
CHLAMYDIA DNA CVX QL NAA+PROBE: NORMAL
N GONORRHOEA DNA GENITAL QL NAA+PROBE: NORMAL

## 2018-06-19 ENCOUNTER — ROUTINE PRENATAL (OUTPATIENT)
Dept: PERINATAL CARE | Facility: MEDICAL CENTER | Age: 28
End: 2018-06-19
Payer: COMMERCIAL

## 2018-06-19 ENCOUNTER — ROUTINE PRENATAL (OUTPATIENT)
Dept: OBGYN CLINIC | Facility: MEDICAL CENTER | Age: 28
End: 2018-06-19

## 2018-06-19 VITALS
HEART RATE: 94 BPM | WEIGHT: 169 LBS | HEIGHT: 64 IN | BODY MASS INDEX: 28.85 KG/M2 | DIASTOLIC BLOOD PRESSURE: 77 MMHG | SYSTOLIC BLOOD PRESSURE: 115 MMHG

## 2018-06-19 VITALS — WEIGHT: 170.8 LBS | SYSTOLIC BLOOD PRESSURE: 124 MMHG | DIASTOLIC BLOOD PRESSURE: 66 MMHG | BODY MASS INDEX: 29.32 KG/M2

## 2018-06-19 DIAGNOSIS — Z53.1 REFUSAL OF BLOOD TRANSFUSIONS AS PATIENT IS JEHOVAH'S WITNESS: ICD-10-CM

## 2018-06-19 DIAGNOSIS — O99.280 THYROID DYSFUNCTION IN PREGNANCY, ANTEPARTUM: Primary | ICD-10-CM

## 2018-06-19 DIAGNOSIS — Z3A.19 19 WEEKS GESTATION OF PREGNANCY: ICD-10-CM

## 2018-06-19 DIAGNOSIS — E07.9 THYROID DYSFUNCTION IN PREGNANCY, ANTEPARTUM: Primary | ICD-10-CM

## 2018-06-19 DIAGNOSIS — Z34.82 PRENATAL CARE, SUBSEQUENT PREGNANCY, SECOND TRIMESTER: Primary | ICD-10-CM

## 2018-06-19 DIAGNOSIS — Z36.86 ENCOUNTER FOR ANTENATAL SCREENING FOR CERVICAL LENGTH: ICD-10-CM

## 2018-06-19 PROBLEM — Z34.81 PRENATAL CARE, SUBSEQUENT PREGNANCY, FIRST TRIMESTER: Status: RESOLVED | Noted: 2018-04-25 | Resolved: 2018-06-19

## 2018-06-19 PROCEDURE — 76817 TRANSVAGINAL US OBSTETRIC: CPT | Performed by: OBSTETRICS & GYNECOLOGY

## 2018-06-19 PROCEDURE — 76805 OB US >/= 14 WKS SNGL FETUS: CPT | Performed by: OBSTETRICS & GYNECOLOGY

## 2018-06-19 PROCEDURE — PNV: Performed by: OBSTETRICS & GYNECOLOGY

## 2018-06-19 NOTE — PROGRESS NOTES
A transvaginal ultrasound was performed  Sonographer note on use of High Level Disinfection Process (Trophon) for transvaginal probe# 1 used, serial [id number]    [address] Liang Branch

## 2018-06-19 NOTE — ASSESSMENT & PLAN NOTE
Having a level 2 ultrasound later today   no new complaints   patient has normal fetal movements   due for her next thyroid testing in 1 month

## 2018-06-19 NOTE — PROGRESS NOTES
Problem List Items Addressed This Visit        Other    Prenatal care, subsequent pregnancy, second trimester - Primary      Having a level 2 ultrasound later today   no new complaints   patient has normal fetal movements   due for her next thyroid testing in 1 month

## 2018-07-19 ENCOUNTER — LAB (OUTPATIENT)
Dept: LAB | Facility: MEDICAL CENTER | Age: 28
End: 2018-07-19
Payer: COMMERCIAL

## 2018-07-19 ENCOUNTER — ROUTINE PRENATAL (OUTPATIENT)
Dept: OBGYN CLINIC | Facility: MEDICAL CENTER | Age: 28
End: 2018-07-19

## 2018-07-19 VITALS — SYSTOLIC BLOOD PRESSURE: 120 MMHG | BODY MASS INDEX: 30.04 KG/M2 | WEIGHT: 175 LBS | DIASTOLIC BLOOD PRESSURE: 62 MMHG

## 2018-07-19 DIAGNOSIS — E07.9 THYROID DYSFUNCTION IN PREGNANCY, ANTEPARTUM: ICD-10-CM

## 2018-07-19 DIAGNOSIS — O99.280 THYROID DYSFUNCTION IN PREGNANCY, ANTEPARTUM: ICD-10-CM

## 2018-07-19 DIAGNOSIS — Z34.82 PRENATAL CARE, SUBSEQUENT PREGNANCY, SECOND TRIMESTER: ICD-10-CM

## 2018-07-19 DIAGNOSIS — Z34.82 PRENATAL CARE, SUBSEQUENT PREGNANCY, SECOND TRIMESTER: Primary | ICD-10-CM

## 2018-07-19 DIAGNOSIS — Z3A.24 24 WEEKS GESTATION OF PREGNANCY: ICD-10-CM

## 2018-07-19 PROBLEM — N91.2 AMENORRHEA: Status: RESOLVED | Noted: 2018-03-06 | Resolved: 2018-07-19

## 2018-07-19 LAB
BASOPHILS # BLD AUTO: 0.04 THOUSANDS/ΜL (ref 0–0.1)
BASOPHILS NFR BLD AUTO: 0 % (ref 0–1)
EOSINOPHIL # BLD AUTO: 0.1 THOUSAND/ΜL (ref 0–0.61)
EOSINOPHIL NFR BLD AUTO: 1 % (ref 0–6)
ERYTHROCYTE [DISTWIDTH] IN BLOOD BY AUTOMATED COUNT: 13.2 % (ref 11.6–15.1)
GLUCOSE 1H P 50 G GLC PO SERPL-MCNC: 89 MG/DL
HCT VFR BLD AUTO: 34.3 % (ref 34.8–46.1)
HGB BLD-MCNC: 11.1 G/DL (ref 11.5–15.4)
IMM GRANULOCYTES # BLD AUTO: 0.04 THOUSAND/UL (ref 0–0.2)
IMM GRANULOCYTES NFR BLD AUTO: 0 % (ref 0–2)
LYMPHOCYTES # BLD AUTO: 2.09 THOUSANDS/ΜL (ref 0.6–4.47)
LYMPHOCYTES NFR BLD AUTO: 21 % (ref 14–44)
MCH RBC QN AUTO: 30.3 PG (ref 26.8–34.3)
MCHC RBC AUTO-ENTMCNC: 32.4 G/DL (ref 31.4–37.4)
MCV RBC AUTO: 94 FL (ref 82–98)
MONOCYTES # BLD AUTO: 0.54 THOUSAND/ΜL (ref 0.17–1.22)
MONOCYTES NFR BLD AUTO: 6 % (ref 4–12)
NEUTROPHILS # BLD AUTO: 7.06 THOUSANDS/ΜL (ref 1.85–7.62)
NEUTS SEG NFR BLD AUTO: 72 % (ref 43–75)
NRBC BLD AUTO-RTO: 0 /100 WBCS
PLATELET # BLD AUTO: 236 THOUSANDS/UL (ref 149–390)
PMV BLD AUTO: 10.2 FL (ref 8.9–12.7)
RBC # BLD AUTO: 3.66 MILLION/UL (ref 3.81–5.12)
TSH SERPL DL<=0.05 MIU/L-ACNC: 3.29 UIU/ML (ref 0.36–3.74)
WBC # BLD AUTO: 9.87 THOUSAND/UL (ref 4.31–10.16)

## 2018-07-19 PROCEDURE — 82950 GLUCOSE TEST: CPT

## 2018-07-19 PROCEDURE — 85025 COMPLETE CBC W/AUTO DIFF WBC: CPT

## 2018-07-19 PROCEDURE — 84443 ASSAY THYROID STIM HORMONE: CPT

## 2018-07-19 PROCEDURE — 86592 SYPHILIS TEST NON-TREP QUAL: CPT

## 2018-07-19 PROCEDURE — 36415 COLL VENOUS BLD VENIPUNCTURE: CPT

## 2018-07-19 PROCEDURE — PNV: Performed by: NURSE PRACTITIONER

## 2018-07-19 NOTE — PROGRESS NOTES
Problem List Items Addressed This Visit     Prenatal care, subsequent pregnancy, second trimester - Primary     Doing well  Denies LOF, VB, CTX  Good FM  It's a [facility]  Relevant Orders    CBC and differential    Glucose, 1H PG    RPR    TSH, 3rd generation with Free T4 reflex    Thyroid dysfunction in pregnancy, antepartum     Never had TSH drawn in early June as instructed by MFM  Given slip today to have drawn with 24-28 week labs  Relevant Orders    CBC and differential    Glucose, 1H PG    RPR    TSH, 3rd generation with Free T4 reflex    24 weeks gestation of pregnancy     Return in 4 weeks               Vikash Branch

## 2018-07-19 NOTE — ASSESSMENT & PLAN NOTE
Never had TSH drawn in early June as instructed by SRINIVAS  Given slip today to have drawn with 24-28 week labs

## 2018-07-20 ENCOUNTER — TELEPHONE (OUTPATIENT)
Dept: OBGYN CLINIC | Facility: CLINIC | Age: 28
End: 2018-07-20

## 2018-07-20 DIAGNOSIS — E03.9 HYPOTHYROIDISM, UNSPECIFIED TYPE: Primary | ICD-10-CM

## 2018-07-20 LAB — RPR SER QL: NORMAL

## 2018-07-20 RX ORDER — LEVOTHYROXINE SODIUM 175 UG/1
175 TABLET ORAL DAILY
Qty: 30 TABLET | Refills: 1 | Status: SHIPPED | OUTPATIENT
Start: 2018-07-20 | End: 2018-08-21 | Stop reason: SDUPTHER

## 2018-07-20 NOTE — PROGRESS NOTES
This OB patient needs an adjustment in her synthroid dose, she currently takes 150mcg - please increase to 175mcg for goal of <3 TSH level and recheck in 4 weeks

## 2018-07-20 NOTE — TELEPHONE ENCOUNTER
----- Message from Warren WhereNet sent at 7/20/2018  7:00 AM EDT -----  This OB patient needs an adjustment in her synthroid dose, she currently takes 150mcg - please increase to 175mcg for goal of <3 TSH level and recheck in 4 weeks

## 2018-08-16 ENCOUNTER — ROUTINE PRENATAL (OUTPATIENT)
Dept: OBGYN CLINIC | Facility: MEDICAL CENTER | Age: 28
End: 2018-08-16

## 2018-08-16 ENCOUNTER — APPOINTMENT (OUTPATIENT)
Dept: LAB | Facility: MEDICAL CENTER | Age: 28
End: 2018-08-16
Payer: COMMERCIAL

## 2018-08-16 VITALS
DIASTOLIC BLOOD PRESSURE: 68 MMHG | WEIGHT: 184 LBS | HEIGHT: 64 IN | BODY MASS INDEX: 31.41 KG/M2 | RESPIRATION RATE: 14 BRPM | SYSTOLIC BLOOD PRESSURE: 110 MMHG

## 2018-08-16 DIAGNOSIS — E03.9 HYPOTHYROIDISM: ICD-10-CM

## 2018-08-16 DIAGNOSIS — O99.280 THYROID DYSFUNCTION IN PREGNANCY, ANTEPARTUM: ICD-10-CM

## 2018-08-16 DIAGNOSIS — E07.9 THYROID DYSFUNCTION IN PREGNANCY, ANTEPARTUM: ICD-10-CM

## 2018-08-16 DIAGNOSIS — E03.9 ACQUIRED HYPOTHYROIDISM: ICD-10-CM

## 2018-08-16 DIAGNOSIS — Z3A.28 28 WEEKS GESTATION OF PREGNANCY: Primary | ICD-10-CM

## 2018-08-16 LAB
T3FREE SERPL-MCNC: 1.98 PG/ML (ref 2.3–4.2)
T4 FREE SERPL-MCNC: 1.28 NG/DL (ref 0.76–1.46)
TSH SERPL DL<=0.05 MIU/L-ACNC: 1.51 UIU/ML (ref 0.36–3.74)

## 2018-08-16 PROCEDURE — 84443 ASSAY THYROID STIM HORMONE: CPT

## 2018-08-16 PROCEDURE — PNV: Performed by: OBSTETRICS & GYNECOLOGY

## 2018-08-16 PROCEDURE — 36415 COLL VENOUS BLD VENIPUNCTURE: CPT

## 2018-08-16 PROCEDURE — 84439 ASSAY OF FREE THYROXINE: CPT

## 2018-08-16 PROCEDURE — 84481 FREE ASSAY (FT-3): CPT

## 2018-08-16 NOTE — PROGRESS NOTES
Patient is a 44-year-old female, P1, at 28 weeks 1 day here for routine prenatal visit without complaint  Pregnancy complicated by hypothyroidism  Patient is a Jehovah Witness    Review of systems is positive for fetal movement negative for loss of fluid vaginal bleeding or regular contractions

## 2018-08-21 ENCOUNTER — TELEPHONE (OUTPATIENT)
Dept: OBGYN CLINIC | Facility: MEDICAL CENTER | Age: 28
End: 2018-08-21

## 2018-08-21 DIAGNOSIS — E03.9 HYPOTHYROIDISM, UNSPECIFIED TYPE: Primary | ICD-10-CM

## 2018-08-21 DIAGNOSIS — E03.9 HYPOTHYROIDISM, UNSPECIFIED TYPE: ICD-10-CM

## 2018-08-21 RX ORDER — LEVOTHYROXINE SODIUM 175 UG/1
175 TABLET ORAL DAILY
Qty: 30 TABLET | Refills: 0 | Status: SHIPPED | OUTPATIENT
Start: 2018-08-21 | End: 2018-09-21 | Stop reason: SDUPTHER

## 2018-08-21 RX ORDER — LEVOTHYROXINE SODIUM 175 UG/1
175 TABLET ORAL DAILY
Qty: 30 TABLET | Refills: 1 | Status: CANCELLED | OUTPATIENT
Start: 2018-08-21 | End: 2018-09-20

## 2018-08-21 NOTE — TELEPHONE ENCOUNTER
Pt called and wanted to know if she should continue taking her thyroid medication  She states she had completed lab work on the 08/16 to retest for it will like to know the results  She also mentioned that her medication was just recently increased and she no longer has anymore and continued back with the lower dosage as of today

## 2018-08-21 NOTE — TELEPHONE ENCOUNTER
Dear Dr Blake Barney:    Pt called office today, wants to know if she should continue taking thyroid medication  Pt also requests recent result of TSH testing completed on 8/16/18  Pt was previously prescribed Levothyroxine 175 mcg tablet 30 tablets 1 refill on  7/20/18 by Apple Segovia  Pt states she ran out of said medication and is currently taking Levothyroxine 150 mcg as previously prescribed by PCP  Please advise as to what is the next step pending your review and interpretation of recent TSH result  Thank you!     Vitaliy Genao MA

## 2018-08-21 NOTE — TELEPHONE ENCOUNTER
Spoke with Pt today via phone call  Pt informed that her recent TSH level was within normal limits (TSH was 1 510) per Dr Chester Pruitt review  Pt advised to continue taking Levothyroxine 175 mcg medication and have repeat TSH test again in 3 months per Dr Chester Pruitt recommendation  TSH order completed in EPIC, copy of said order mailed to Pt's mailing address in EHR per Pt's request   Pt further informed that Rx for Levothyroxine 175 mcg tablet (Take 1 tablet PO daily for 30 days 1 refill) was electronically forwarded to Pt's pharmacy in EHR pending Dr Radha Vasquez  "Patient Call" routed to Dr Salvador Martines for Rx signature

## 2018-08-21 NOTE — TELEPHONE ENCOUNTER
8/16/18 TSH was 1 510  This result is right where we want it  I would recommend staying on 175 and testing again in 3 months

## 2018-08-23 ENCOUNTER — TELEPHONE (OUTPATIENT)
Dept: OBGYN CLINIC | Facility: CLINIC | Age: 28
End: 2018-08-23

## 2018-08-23 NOTE — TELEPHONE ENCOUNTER
Pt usually gets 3 mo supply - cheaper than 1 mo  I called in levothyroxine 175 mcg, sig 1 od, no refills  Put rx "on file" as pt does not need it presently  She is aware she might have 1 mo extra pills but is much cheaper  Pt will go back for tsh in 3 mos   Script on chart

## 2018-08-23 NOTE — TELEPHONE ENCOUNTER
Patient left  - stated she picked up her thyroid medication and it was for 1 month - she usually gets a 90 day supply  Wants to know why it was for only 1 month with no refills

## 2018-08-27 ENCOUNTER — ROUTINE PRENATAL (OUTPATIENT)
Dept: OBGYN CLINIC | Age: 28
End: 2018-08-27

## 2018-08-27 VITALS — SYSTOLIC BLOOD PRESSURE: 120 MMHG | WEIGHT: 185 LBS | DIASTOLIC BLOOD PRESSURE: 62 MMHG | BODY MASS INDEX: 31.76 KG/M2

## 2018-08-27 DIAGNOSIS — O99.280 THYROID DYSFUNCTION IN PREGNANCY, ANTEPARTUM: ICD-10-CM

## 2018-08-27 DIAGNOSIS — Z34.83 ENCOUNTER FOR SUPERVISION OF OTHER NORMAL PREGNANCY IN THIRD TRIMESTER: Primary | ICD-10-CM

## 2018-08-27 DIAGNOSIS — Z3A.29 29 WEEKS GESTATION OF PREGNANCY: ICD-10-CM

## 2018-08-27 DIAGNOSIS — E07.9 THYROID DYSFUNCTION IN PREGNANCY, ANTEPARTUM: ICD-10-CM

## 2018-08-27 DIAGNOSIS — Z53.1 REFUSAL OF BLOOD TRANSFUSIONS AS PATIENT IS JEHOVAH'S WITNESS: ICD-10-CM

## 2018-08-27 PROBLEM — Z34.90 SUPERVISION OF NORMAL PREGNANCY: Status: ACTIVE | Noted: 2018-08-27

## 2018-08-27 PROCEDURE — PNV: Performed by: NURSE PRACTITIONER

## 2018-08-27 NOTE — PROGRESS NOTES
Problem List Items Addressed This Visit     Refusal of blood transfusions as patient is Hoahaoism    Thyroid dysfunction in pregnancy, antepartum    29 weeks gestation of pregnancy    Supervision of normal pregnancy - Primary        Feels well  Denies LOF/CTX/VB  No concerns  Discussed fetal kick counting  Will think about TDAP for next visit  FG 32 wks on 9/11  Just upped her levothyroxine-will recheck in 4 wks

## 2018-08-27 NOTE — PATIENT INSTRUCTIONS
Pregnancy at 31 to 34 [address]:   You may continue to have symptoms such as shortness of breath, heartburn, contractions, or swelling of your ankles and feet  You may be gaining about 1 pound a week now  DISCHARGE INSTRUCTIONS:   Return to the emergency department if:   · You develop a severe headache that does not go away  · You have new or increased vision changes, such as blurred or spotted vision  · You have new or increased swelling in your face or hands  · You have vaginal spotting or bleeding  · Your water broke or you feel warm water gushing or trickling from your vagina  Contact your healthcare provider if:   · You have more than 5 contractions in 1 hour  · You notice any changes in your baby's movements  · You have abdominal cramps, pressure, or tightening  · You have a change in vaginal discharge  · You have chills or a fever  · You have vaginal itching, burning, or pain  · You have yellow, green, white, or foul-smelling vaginal discharge  · You have pain or burning when you urinate, less urine than usual, or pink or bloody urine  · You have questions or concerns about your condition or care  How to care for yourself at this stage of your pregnancy:   · Eat a variety of healthy foods  Healthy foods include fruits, vegetables, whole-grain breads, low-fat dairy foods, beans, lean meats, and fish  Drink liquids as directed  Ask how much liquid to drink each day and which liquids are best for you  Limit caffeine to less than 200 milligrams each day  Limit your intake of fish to 2 servings each week  Choose fish low in mercury such as canned light tuna, shrimp, salmon, cod, or tilapia  Do not  eat fish high in mercury such as swordfish, tilefish, radha mackerel, and shark  · Manage heartburn  by eating 4 or 5 small meals each day instead of large meals  Avoid spicy food  · Manage swelling  by lying down and putting your feet up       · Take prenatal vitamins as directed  Your need for certain vitamins and minerals, such as folic acid, increases during pregnancy  Prenatal vitamins provide some of the extra vitamins and minerals you need  Prenatal vitamins may also help to decrease the risk of certain birth defects  · Talk to your healthcare provider about exercise  Moderate exercise can help you stay fit  Your healthcare provider will help you plan an exercise program that is safe for you during pregnancy  · Do not smoke  If you smoke, it is never too late to quit  Smoking increases your risk of a miscarriage and other health problems during your pregnancy  Smoking can cause your baby to be born too early or weigh less at birth  Ask your healthcare provider for information if you need help quitting  · Do not drink alcohol  Alcohol passes from your body to your baby through the placenta  It can affect your baby's brain development and cause fetal alcohol syndrome (FAS)  FAS is a group of conditions that causes mental, behavior, and growth problems  · Talk to your healthcare provider before you take any medicines  Many medicines may harm your baby if you take them when you are pregnant  Do not take any medicines, vitamins, herbs, or supplements without first talking to your healthcare provider  Never use illegal or street drugs (such as marijuana or cocaine) while you are pregnant  Safety tips during pregnancy:   · Avoid hot tubs and saunas  Do not use a hot tub or sauna while you are pregnant, especially during your first trimester  Hot tubs and saunas may raise your baby's temperature and increase the risk of birth defects  · Avoid toxoplasmosis  This is an infection caused by eating raw meat or being around infected cat feces  It can cause birth defects, miscarriages, and other problems  Wash your hands after you touch raw meat  Make sure any meat is well-cooked before you eat it  Avoid raw eggs and unpasteurized milk   Use gloves or ask someone else to clean your cat's litter box while you are pregnant  Changes that are happening with your baby:  By 34 weeks, your baby may weigh more than 5 pounds  Your baby will be about 12 ½ inches long from the top of the head to the rump (baby's bottom)  Your baby is gaining about ½ pound a week  Your baby's eyes open and close now  Your baby's kicks and movements are more forceful at this time  What you need to know about prenatal care: Your healthcare provider will check your blood pressure and weight  You may also need the following:  · A urine test  may also be done to check for sugar and protein  These can be signs of gestational diabetes or infection  Protein in your urine may also be a sign of preeclampsia  Preeclampsia is a condition that can develop during week 20 or later of your pregnancy  It causes high blood pressure, and it can cause problems with your kidneys and other organs  · A Tdap vaccine  may be recommended by your healthcare provider  · Fundal height  is a measurement of your uterus to check your baby's growth  This number is usually the same as the number of weeks that you have been pregnant  Your healthcare provider may also check your baby's position  · Your baby's heart rate  will be checked  © 2017 [address]  Information is for End User's use only and may not be sold, redistributed or otherwise used for commercial purposes  All illustrations and images included in CareNotes® are the copyrighted property of [employer]  or [employer]  The above information is an  only  It is not intended as medical advice for individual conditions or treatments  Talk to your doctor, nurse or pharmacist before following any medical regimen to see if it is safe and effective for you

## 2018-09-11 ENCOUNTER — ULTRASOUND (OUTPATIENT)
Dept: PERINATAL CARE | Facility: MEDICAL CENTER | Age: 28
End: 2018-09-11
Payer: COMMERCIAL

## 2018-09-11 ENCOUNTER — ROUTINE PRENATAL (OUTPATIENT)
Dept: OBGYN CLINIC | Facility: MEDICAL CENTER | Age: 28
End: 2018-09-11
Payer: COMMERCIAL

## 2018-09-11 VITALS — BODY MASS INDEX: 32.96 KG/M2 | WEIGHT: 192 LBS | SYSTOLIC BLOOD PRESSURE: 116 MMHG | DIASTOLIC BLOOD PRESSURE: 70 MMHG

## 2018-09-11 VITALS
SYSTOLIC BLOOD PRESSURE: 114 MMHG | BODY MASS INDEX: 32.44 KG/M2 | WEIGHT: 190 LBS | HEART RATE: 94 BPM | HEIGHT: 64 IN | DIASTOLIC BLOOD PRESSURE: 69 MMHG

## 2018-09-11 DIAGNOSIS — IMO0002 EVALUATE ANATOMY NOT SEEN ON PRIOR SONOGRAM: ICD-10-CM

## 2018-09-11 DIAGNOSIS — Z3A.31 31 WEEKS GESTATION OF PREGNANCY: Primary | ICD-10-CM

## 2018-09-11 DIAGNOSIS — Z34.82 PRENATAL CARE, SUBSEQUENT PREGNANCY, SECOND TRIMESTER: ICD-10-CM

## 2018-09-11 DIAGNOSIS — O99.280 THYROID DYSFUNCTION IN PREGNANCY, ANTEPARTUM: ICD-10-CM

## 2018-09-11 DIAGNOSIS — Z36.89 ENCOUNTER FOR ULTRASOUND TO CHECK FETAL GROWTH: ICD-10-CM

## 2018-09-11 DIAGNOSIS — E07.9 THYROID DYSFUNCTION IN PREGNANCY, ANTEPARTUM: ICD-10-CM

## 2018-09-11 DIAGNOSIS — Z23 NEED FOR TDAP VACCINATION: Primary | ICD-10-CM

## 2018-09-11 PROCEDURE — 90471 IMMUNIZATION ADMIN: CPT

## 2018-09-11 PROCEDURE — PNV: Performed by: PHYSICIAN ASSISTANT

## 2018-09-11 PROCEDURE — 99213 OFFICE O/P EST LOW 20 MIN: CPT | Performed by: OBSTETRICS & GYNECOLOGY

## 2018-09-11 PROCEDURE — 90715 TDAP VACCINE 7 YRS/> IM: CPT

## 2018-09-11 PROCEDURE — 76816 OB US FOLLOW-UP PER FETUS: CPT | Performed by: OBSTETRICS & GYNECOLOGY

## 2018-09-11 RX ORDER — FERROUS SULFATE TAB EC 324 MG (65 MG FE EQUIVALENT) 324 (65 FE) MG
324 TABLET DELAYED RESPONSE ORAL DAILY
Qty: 90 TABLET | Refills: 3 | Status: SHIPPED | OUTPATIENT
Start: 2018-09-11 | End: 2019-02-07

## 2018-09-11 NOTE — PROGRESS NOTES
Patient w/o complaints  (+) good fetal movement, denies any bleeding, fluid leakage or ctx  Baby breech position for f/u US  Pt wants version if needed, she had with first son and was successful and had vaginal delivery      Problem List Items Addressed This Visit     Prenatal care, subsequent pregnancy, second trimester     RTO 2 wks  tdap given today  Would want to attempt version if baby still breech later on  Reviewed PTL precautions, fetal kick counts and reasons to call           Other Visit Diagnoses     Need for Tdap vaccination    -  Primary    Relevant Orders    TDAP Vaccine greater than or equal to 6yo (Completed)

## 2018-09-11 NOTE — PROGRESS NOTES
[address] [facility] [place]: Ms Jacqueline Toribio was seen today at [address] for fetal growth and followup missed anatomy ultrasound  See ultrasound report under "OB Procedures" tab  Please don't hesitate to contact our office with any concerns or questions    Vashti Coy MD

## 2018-09-11 NOTE — PATIENT INSTRUCTIONS
I recommend repeating your thyroid tests 4 weeks from August 21, 2018  At that time I recommend getting iron studies and a repeat complete blood count as well  We at that time can assess the efficacy of your oral iron supplementation and also assess the need for any IV iron infusions  One medication that can be used to prevent postpartum hemorrhage is tranexamic acid

## 2018-09-11 NOTE — ASSESSMENT & PLAN NOTE
RTO 2 wks  tdap given today  Would want to attempt version if baby still breech later on  Reviewed PTL precautions, fetal kick counts and reasons to call

## 2018-09-14 ENCOUNTER — TELEPHONE (OUTPATIENT)
Dept: OBGYN CLINIC | Facility: CLINIC | Age: 28
End: 2018-09-14

## 2018-09-14 DIAGNOSIS — D50.9 IRON DEFICIENCY ANEMIA, UNSPECIFIED IRON DEFICIENCY ANEMIA TYPE: Primary | ICD-10-CM

## 2018-09-14 NOTE — TELEPHONE ENCOUNTER
----- Message from Steen Sandhoff, MD sent at 9/14/2018 12:11 PM EDT -----  Please call Radha Pat - I added the labs to her TSH order- as recommended by Dr Dary Demarco  ----- Message -----  From: Shiloh Rivera MD  Sent: 9/12/2018   8:18 AM  To: Steen Sandhoff, MD HI karen, you are seeing this patient at the end of the month, I would add iron studies with CBC on to her TFTs, the family is open to IV iron if necessary though I don't think it will be, I just want her hemoglobin to be optimized heading into delivery since they decline transfusion  Thank you!   catalino

## 2018-09-14 NOTE — TELEPHONE ENCOUNTER
Patient returned returned call - she is aware that labs were added to her TSH order  She will go for them next week

## 2018-09-20 ENCOUNTER — APPOINTMENT (OUTPATIENT)
Dept: LAB | Facility: CLINIC | Age: 28
End: 2018-09-20
Payer: COMMERCIAL

## 2018-09-20 ENCOUNTER — TELEPHONE (OUTPATIENT)
Dept: OBGYN CLINIC | Facility: CLINIC | Age: 28
End: 2018-09-20

## 2018-09-20 DIAGNOSIS — E03.9 HYPOTHYROIDISM, UNSPECIFIED TYPE: Primary | ICD-10-CM

## 2018-09-20 DIAGNOSIS — E03.9 HYPOTHYROIDISM, UNSPECIFIED TYPE: ICD-10-CM

## 2018-09-20 DIAGNOSIS — D50.9 IRON DEFICIENCY ANEMIA, UNSPECIFIED IRON DEFICIENCY ANEMIA TYPE: ICD-10-CM

## 2018-09-20 LAB
BASOPHILS # BLD AUTO: 0.02 THOUSANDS/ΜL (ref 0–0.1)
BASOPHILS NFR BLD AUTO: 0 % (ref 0–1)
EOSINOPHIL # BLD AUTO: 0.12 THOUSAND/ΜL (ref 0–0.61)
EOSINOPHIL NFR BLD AUTO: 1 % (ref 0–6)
ERYTHROCYTE [DISTWIDTH] IN BLOOD BY AUTOMATED COUNT: 13.2 % (ref 11.6–15.1)
FERRITIN SERPL-MCNC: 8 NG/ML (ref 8–388)
HCT VFR BLD AUTO: 32.3 % (ref 34.8–46.1)
HGB BLD-MCNC: 10.6 G/DL (ref 11.5–15.4)
IMM GRANULOCYTES # BLD AUTO: 0.13 THOUSAND/UL (ref 0–0.2)
IMM GRANULOCYTES NFR BLD AUTO: 1 % (ref 0–2)
LYMPHOCYTES # BLD AUTO: 2.11 THOUSANDS/ΜL (ref 0.6–4.47)
LYMPHOCYTES NFR BLD AUTO: 18 % (ref 14–44)
MCH RBC QN AUTO: 29.8 PG (ref 26.8–34.3)
MCHC RBC AUTO-ENTMCNC: 32.8 G/DL (ref 31.4–37.4)
MCV RBC AUTO: 91 FL (ref 82–98)
MONOCYTES # BLD AUTO: 0.56 THOUSAND/ΜL (ref 0.17–1.22)
MONOCYTES NFR BLD AUTO: 5 % (ref 4–12)
NEUTROPHILS # BLD AUTO: 8.8 THOUSANDS/ΜL (ref 1.85–7.62)
NEUTS SEG NFR BLD AUTO: 75 % (ref 43–75)
NRBC BLD AUTO-RTO: 0 /100 WBCS
PLATELET # BLD AUTO: 179 THOUSANDS/UL (ref 149–390)
PMV BLD AUTO: 9.3 FL (ref 8.9–12.7)
RBC # BLD AUTO: 3.56 MILLION/UL (ref 3.81–5.12)
TIBC SERPL-MCNC: 427 UG/DL (ref 250–450)
TSH SERPL DL<=0.05 MIU/L-ACNC: 0.75 UIU/ML (ref 0.36–3.74)
WBC # BLD AUTO: 11.74 THOUSAND/UL (ref 4.31–10.16)

## 2018-09-20 PROCEDURE — 82728 ASSAY OF FERRITIN: CPT

## 2018-09-20 PROCEDURE — 36415 COLL VENOUS BLD VENIPUNCTURE: CPT

## 2018-09-20 PROCEDURE — 84443 ASSAY THYROID STIM HORMONE: CPT

## 2018-09-20 PROCEDURE — 83550 IRON BINDING TEST: CPT

## 2018-09-20 PROCEDURE — 85025 COMPLETE CBC W/AUTO DIFF WBC: CPT

## 2018-09-20 NOTE — TELEPHONE ENCOUNTER
Chantal Cockayne in [facility] call in regards to a patient - she was at their to do a TSH that she was told by a provider to have done now  Review notes - first order was from [address] [place] [address] to have done in November - but pt saw Josseline Estrada few days later on  08/27  She increased her thyroid medication and advised pt to have TSH tested in 4 weeks which is now  Updated lab order

## 2018-09-21 ENCOUNTER — TELEPHONE (OUTPATIENT)
Dept: OBGYN CLINIC | Facility: CLINIC | Age: 28
End: 2018-09-21

## 2018-09-21 DIAGNOSIS — E03.9 HYPOTHYROIDISM, UNSPECIFIED TYPE: ICD-10-CM

## 2018-09-21 RX ORDER — LEVOTHYROXINE SODIUM 0.15 MG/1
150 TABLET ORAL DAILY
Qty: 90 TABLET | Refills: 0 | Status: SHIPPED | OUTPATIENT
Start: 2018-09-21 | End: 2019-03-25 | Stop reason: SDUPTHER

## 2018-09-21 NOTE — TELEPHONE ENCOUNTER
Pt states she was already told by the pnc re iron, and has begun taking it    She will call pcp re throid med

## 2018-09-21 NOTE — TELEPHONE ENCOUNTER
----- Message from Konstantin Steinberg MD sent at 9/21/2018  2:33 PM EDT -----  Labs reviewed  Slightly anemic and iron level is low normal   Recommend iron tablets at least once daily to prevent postdelivery anemia  TSH - need to lower thyroid medication  150mcg tablets instead of 175

## 2018-09-25 ENCOUNTER — ROUTINE PRENATAL (OUTPATIENT)
Dept: OBGYN CLINIC | Age: 28
End: 2018-09-25

## 2018-09-25 VITALS — SYSTOLIC BLOOD PRESSURE: 90 MMHG | WEIGHT: 189.38 LBS | BODY MASS INDEX: 32.51 KG/M2 | DIASTOLIC BLOOD PRESSURE: 58 MMHG

## 2018-09-25 DIAGNOSIS — Z34.83 ENCOUNTER FOR SUPERVISION OF OTHER NORMAL PREGNANCY, THIRD TRIMESTER: Primary | ICD-10-CM

## 2018-09-25 PROBLEM — Z3A.31 31 WEEKS GESTATION OF PREGNANCY: Status: RESOLVED | Noted: 2018-06-19 | Resolved: 2018-09-25

## 2018-09-25 PROCEDURE — PNV: Performed by: OBSTETRICS & GYNECOLOGY

## 2018-09-25 NOTE — PROGRESS NOTES
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 female.    Chief Complaint: Annual Exam    HPI     Pt presents to clinic for a routine annual exam.   She takes HCTZ 50 mg daily plus occasional Lasix to help control her chronic lymphedema. She, also, uses compression hoses with some benefit.   She has attempted to obtain stools for occult blood - reports difficulty obtaining a specimen. Feels as though she may be willing to undergo a colonoscopy.     Review of Systems   Constitutional: Negative for chills and fever.   Respiratory: Negative for cough, chest tightness, shortness of breath and wheezing.    Cardiovascular: Positive for palpitations (occasional - no change for the past several years) and leg swelling. Negative for chest pain.   Gastrointestinal: Negative for blood in stool, diarrhea, nausea and vomiting.   Genitourinary: Positive for urgency. Negative for difficulty urinating, dysuria, frequency and hematuria.   Musculoskeletal: Positive for arthralgias and myalgias.   Skin: Negative for rash and wound.   Neurological: Negative for dizziness, light-headedness and headaches.   Psychiatric/Behavioral: Negative for dysphoric mood and sleep disturbance. The patient is not nervous/anxious.        Objective:      Physical Exam   Constitutional: She is oriented to person, place, and time. She appears well-developed and well-nourished.   HENT:   Head: Normocephalic and atraumatic.   Right Ear: External ear normal.   Left Ear: External ear normal.   Nose: Nose normal.   Mouth/Throat: Oropharynx is clear and moist.   Eyes: Pupils are equal, round, and reactive to light. Right eye exhibits no discharge. Left eye exhibits no discharge.   Neck: Normal range of motion. Neck supple. Carotid bruit is not present. No thyromegaly present.   Cardiovascular: Normal rate, regular rhythm, normal heart sounds and intact distal pulses.    No murmur heard.  Pulmonary/Chest: Effort normal and breath sounds normal.  No respiratory distress. She has no wheezes. She has no rales.   Abdominal: Soft. Bowel sounds are normal. She exhibits no distension and no mass. There is no tenderness. There is no guarding.   Musculoskeletal: Normal range of motion. She exhibits edema (Ble's). She exhibits no tenderness or deformity.   Lymphadenopathy:     She has no cervical adenopathy.   Neurological: She is alert and oriented to person, place, and time. No cranial nerve deficit.   Skin: Skin is warm and dry. Capillary refill takes less than 2 seconds.   Psychiatric: She has a normal mood and affect. Her behavior is normal.   Nursing note and vitals reviewed.      Assessment:       1. General medical exam    2. Other hyperlipidemia    3. Hereditary lymphedema    4. Screening for breast cancer    5. Screen for colon cancer        Plan:   Liza was seen today for annual exam.    Diagnoses and all orders for this visit:    General medical exam  -     Lipid panel; Future  -     Comprehensive metabolic panel; Future  -     Case request GI: COLONOSCOPY  -     Mammo Digital Screening Bilateral With CAD; Future  Update labs, cancer screenings.   Anticipatory guidance reviewed.     Other hyperlipidemia  -     Lipid panel; Future  -     Comprehensive metabolic panel; Future  Stable. Continue current regimen. Routine f/u.     Hereditary lymphedema  Stable. Continue current regimen.     Screening for breast cancer  -     Mammo Digital Screening Bilateral With CAD; Future    Screen for colon cancer  -     Case request GI: COLONOSCOPY

## 2018-09-25 NOTE — ASSESSMENT & PLAN NOTE
1  Breech - plan for visit in 1-2 weeks if still breech need referral to [facility] for possible version    2  Hypothyroidism - TSH results reviewed- continue with 150mcg dosing (was at one point upped to 175)    3  [place]ple - is on PNV and iron

## 2018-10-08 ENCOUNTER — ROUTINE PRENATAL (OUTPATIENT)
Dept: OBGYN CLINIC | Facility: MEDICAL CENTER | Age: 28
End: 2018-10-08
Payer: COMMERCIAL

## 2018-10-08 VITALS — WEIGHT: 190 LBS | SYSTOLIC BLOOD PRESSURE: 110 MMHG | BODY MASS INDEX: 32.61 KG/M2 | DIASTOLIC BLOOD PRESSURE: 62 MMHG

## 2018-10-08 DIAGNOSIS — Z23 NEED FOR INFLUENZA VACCINATION: ICD-10-CM

## 2018-10-08 DIAGNOSIS — Z53.1 REFUSAL OF BLOOD TRANSFUSIONS AS PATIENT IS JEHOVAH'S WITNESS: ICD-10-CM

## 2018-10-08 DIAGNOSIS — Z34.83 ENCOUNTER FOR SUPERVISION OF OTHER NORMAL PREGNANCY IN THIRD TRIMESTER: Primary | ICD-10-CM

## 2018-10-08 PROCEDURE — 90686 IIV4 VACC NO PRSV 0.5 ML IM: CPT

## 2018-10-08 PROCEDURE — PNV: Performed by: OBSTETRICS & GYNECOLOGY

## 2018-10-08 PROCEDURE — 90471 IMMUNIZATION ADMIN: CPT

## 2018-10-08 NOTE — PROGRESS NOTES
Last few weeks of pregnancy paper given  Baby was breech last visit and she would like to schedule appointment for version with [facility] if still breech today  Flu shot given  GBS next visit

## 2018-10-08 NOTE — PROGRESS NOTES
Patient doing well  Fetus still breech on TAUS  She was scheduled for [facility] appointment tomorrow at 1pm in [place] for consult to discuss version  She had a successful version with her first pregnancy      Problem List Items Addressed This Visit        Other    Refusal of blood transfusions as patient is Protestant    Supervision of normal pregnancy - Primary    Breech presentation, no version    Relevant Orders    Ambulatory Referral to Maternal Fetal Medicine      Other Visit Diagnoses     Need for influenza vaccination        Relevant Orders    influenza vaccine, [id number], quadrivalent, 0 5 mL, preservative-free (SYRINGE, SINGLE-DOSE VIAL), for adult and pediatric patients 3 yr+ (AFLURIA, FLUARIX, FLULAVAL, FLUZONE) (Completed)

## 2018-10-09 ENCOUNTER — TELEPHONE (OUTPATIENT)
Dept: OBGYN CLINIC | Facility: MEDICAL CENTER | Age: 28
End: 2018-10-09

## 2018-10-09 ENCOUNTER — ULTRASOUND (OUTPATIENT)
Dept: PERINATAL CARE | Facility: MEDICAL CENTER | Age: 28
End: 2018-10-09
Payer: COMMERCIAL

## 2018-10-09 VITALS
DIASTOLIC BLOOD PRESSURE: 60 MMHG | HEART RATE: 89 BPM | SYSTOLIC BLOOD PRESSURE: 94 MMHG | HEIGHT: 64 IN | BODY MASS INDEX: 32.61 KG/M2 | WEIGHT: 191 LBS

## 2018-10-09 DIAGNOSIS — Z3A.35 35 WEEKS GESTATION OF PREGNANCY: ICD-10-CM

## 2018-10-09 DIAGNOSIS — Z36.89 ENCOUNTER FOR ULTRASOUND TO CHECK FETAL GROWTH: ICD-10-CM

## 2018-10-09 PROCEDURE — 76816 OB US FOLLOW-UP PER FETUS: CPT | Performed by: OBSTETRICS & GYNECOLOGY

## 2018-10-09 PROCEDURE — 99212 OFFICE O/P EST SF 10 MIN: CPT | Performed by: OBSTETRICS & GYNECOLOGY

## 2018-10-09 NOTE — PATIENT INSTRUCTIONS
The staff of your OBGYN office will contact you to schedule the external cephalic version  If you go into labor prior to your procedure, please let your doctors know immediately, as a  delivery will likely be required

## 2018-10-09 NOTE — TELEPHONE ENCOUNTER
Pt was brought to the [facility] office by  to schedule an aversion  She sees Stephanie on 10/16 and she is 35 weeks and 6 days  Please advise

## 2018-10-09 NOTE — PROGRESS NOTES
[facility] [place]: Ms [place] was seen today at 35w6d for fetal growth assessment ultrasound  See ultrasound report under "OB Procedures" tab  Fetus remains BREECH  Please don't hesitate to contact our office with any concerns or questions    Yashira MD [place]

## 2018-10-09 NOTE — TELEPHONE ENCOUNTER
I do not typically do versions, discussed with Skip Barker - usually [facility] is provider that will  She is going to check with him tomorrow

## 2018-10-10 ENCOUNTER — TELEPHONE (OUTPATIENT)
Dept: OBGYN CLINIC | Facility: CLINIC | Age: 28
End: 2018-10-10

## 2018-10-10 ENCOUNTER — HOSPITAL ENCOUNTER (OUTPATIENT)
Facility: HOSPITAL | Age: 28
Discharge: HOME | End: 2018-10-10
Attending: EMERGENCY MEDICINE | Admitting: OBSTETRICS & GYNECOLOGY
Payer: COMMERCIAL

## 2018-10-10 VITALS
SYSTOLIC BLOOD PRESSURE: 103 MMHG | OXYGEN SATURATION: 100 % | RESPIRATION RATE: 18 BRPM | HEART RATE: 72 BPM | TEMPERATURE: 98 F | DIASTOLIC BLOOD PRESSURE: 66 MMHG

## 2018-10-10 DIAGNOSIS — R11.0 NAUSEA: ICD-10-CM

## 2018-10-10 DIAGNOSIS — M54.50 LEFT LOW BACK PAIN: ICD-10-CM

## 2018-10-10 DIAGNOSIS — R55 PRE-SYNCOPE: Primary | ICD-10-CM

## 2018-10-10 DIAGNOSIS — H53.8 VISUAL BLURRINESS: ICD-10-CM

## 2018-10-10 PROBLEM — Z3A.36 36 WEEKS GESTATION OF PREGNANCY: Status: ACTIVE | Noted: 2018-10-10

## 2018-10-10 PROBLEM — O36.8390 MATERNAL CARE FOR FETAL TACHYCARDIA DURING PREGNANCY: Status: ACTIVE | Noted: 2018-10-10

## 2018-10-10 LAB
ALBUMIN SERPL BCP-MCNC: 2.6 G/DL (ref 3.5–5)
ALP SERPL-CCNC: 101 U/L (ref 46–116)
ALT SERPL W P-5'-P-CCNC: 20 U/L (ref 12–78)
ANION GAP SERPL CALCULATED.3IONS-SCNC: 7 MMOL/L (ref 4–13)
AST SERPL W P-5'-P-CCNC: 18 U/L (ref 5–45)
ATRIAL RATE: 79 BPM
BASOPHILS # BLD AUTO: 0.04 THOUSANDS/ΜL (ref 0–0.1)
BASOPHILS NFR BLD AUTO: 0 % (ref 0–1)
BILIRUB SERPL-MCNC: 0.19 MG/DL (ref 0.2–1)
BILIRUB UR QL STRIP: NEGATIVE
BUN SERPL-MCNC: 9 MG/DL (ref 5–25)
CALCIUM SERPL-MCNC: 8.2 MG/DL (ref 8.3–10.1)
CHLORIDE SERPL-SCNC: 106 MMOL/L (ref 100–108)
CLARITY UR: CLEAR
CLARITY, POC: CLEAR
CO2 SERPL-SCNC: 21 MMOL/L (ref 21–32)
COLOR UR: YELLOW
COLOR, POC: YELLOW
CREAT SERPL-MCNC: 0.84 MG/DL (ref 0.6–1.3)
EOSINOPHIL # BLD AUTO: 0.12 THOUSAND/ΜL (ref 0–0.61)
EOSINOPHIL NFR BLD AUTO: 1 % (ref 0–6)
ERYTHROCYTE [DISTWIDTH] IN BLOOD BY AUTOMATED COUNT: 13.7 % (ref 11.6–15.1)
FERRITIN SERPL-MCNC: 12 NG/ML (ref 8–388)
GFR SERPL CREATININE-BSD FRML MDRD: 96 ML/MIN/1.73SQ M
GLUCOSE SERPL-MCNC: 89 MG/DL (ref 65–140)
GLUCOSE UR STRIP-MCNC: NEGATIVE MG/DL
HCT VFR BLD AUTO: 32.6 % (ref 34.8–46.1)
HGB BLD-MCNC: 10.7 G/DL (ref 11.5–15.4)
HGB UR QL STRIP.AUTO: NEGATIVE
IMM GRANULOCYTES # BLD AUTO: 0.12 THOUSAND/UL (ref 0–0.2)
IMM GRANULOCYTES NFR BLD AUTO: 1 % (ref 0–2)
IRON SATN MFR SERPL: 14 %
IRON SERPL-MCNC: 54 UG/DL (ref 50–170)
KETONES UR STRIP-MCNC: NEGATIVE MG/DL
LEUKOCYTE ESTERASE UR QL STRIP: NEGATIVE
LYMPHOCYTES # BLD AUTO: 1.64 THOUSANDS/ΜL (ref 0.6–4.47)
LYMPHOCYTES NFR BLD AUTO: 16 % (ref 14–44)
MCH RBC QN AUTO: 30.1 PG (ref 26.8–34.3)
MCHC RBC AUTO-ENTMCNC: 32.8 G/DL (ref 31.4–37.4)
MCV RBC AUTO: 92 FL (ref 82–98)
MONOCYTES # BLD AUTO: 0.68 THOUSAND/ΜL (ref 0.17–1.22)
MONOCYTES NFR BLD AUTO: 7 % (ref 4–12)
NEUTROPHILS # BLD AUTO: 7.76 THOUSANDS/ΜL (ref 1.85–7.62)
NEUTS SEG NFR BLD AUTO: 75 % (ref 43–75)
NITRITE UR QL STRIP: NEGATIVE
NRBC BLD AUTO-RTO: 0 /100 WBCS
P AXIS: 56 DEGREES
PH UR STRIP.AUTO: 6.5 [PH] (ref 4.5–8)
PLATELET # BLD AUTO: 152 THOUSANDS/UL (ref 149–390)
PMV BLD AUTO: 9.3 FL (ref 8.9–12.7)
POTASSIUM SERPL-SCNC: 3.4 MMOL/L (ref 3.5–5.3)
PR INTERVAL: 188 MS
PROT SERPL-MCNC: 6.4 G/DL (ref 6.4–8.2)
PROT UR STRIP-MCNC: NEGATIVE MG/DL
QRS AXIS: 28 DEGREES
QRSD INTERVAL: 90 MS
QT INTERVAL: 356 MS
QTC INTERVAL: 408 MS
RBC # BLD AUTO: 3.55 MILLION/UL (ref 3.81–5.12)
SODIUM SERPL-SCNC: 134 MMOL/L (ref 136–145)
SP GR UR STRIP.AUTO: 1.01 (ref 1–1.03)
T WAVE AXIS: 13 DEGREES
TIBC SERPL-MCNC: 392 UG/DL (ref 250–450)
TSH SERPL DL<=0.05 MIU/L-ACNC: 3.32 UIU/ML (ref 0.36–3.74)
UROBILINOGEN UR QL STRIP.AUTO: 0.2 E.U./DL
VENTRICULAR RATE: 79 BPM
WBC # BLD AUTO: 10.36 THOUSAND/UL (ref 4.31–10.16)

## 2018-10-10 PROCEDURE — 96360 HYDRATION IV INFUSION INIT: CPT

## 2018-10-10 PROCEDURE — 87086 URINE CULTURE/COLONY COUNT: CPT

## 2018-10-10 PROCEDURE — 82728 ASSAY OF FERRITIN: CPT | Performed by: EMERGENCY MEDICINE

## 2018-10-10 PROCEDURE — 93010 ELECTROCARDIOGRAM REPORT: CPT | Performed by: INTERNAL MEDICINE

## 2018-10-10 PROCEDURE — 80053 COMPREHEN METABOLIC PANEL: CPT | Performed by: EMERGENCY MEDICINE

## 2018-10-10 PROCEDURE — 99213 OFFICE O/P EST LOW 20 MIN: CPT | Performed by: OBSTETRICS & GYNECOLOGY

## 2018-10-10 PROCEDURE — 84443 ASSAY THYROID STIM HORMONE: CPT | Performed by: OBSTETRICS & GYNECOLOGY

## 2018-10-10 PROCEDURE — 36415 COLL VENOUS BLD VENIPUNCTURE: CPT

## 2018-10-10 PROCEDURE — 81003 URINALYSIS AUTO W/O SCOPE: CPT

## 2018-10-10 PROCEDURE — 93005 ELECTROCARDIOGRAM TRACING: CPT

## 2018-10-10 PROCEDURE — 83540 ASSAY OF IRON: CPT | Performed by: EMERGENCY MEDICINE

## 2018-10-10 PROCEDURE — 83550 IRON BINDING TEST: CPT | Performed by: EMERGENCY MEDICINE

## 2018-10-10 PROCEDURE — 99214 OFFICE O/P EST MOD 30 MIN: CPT

## 2018-10-10 PROCEDURE — 85025 COMPLETE CBC W/AUTO DIFF WBC: CPT | Performed by: EMERGENCY MEDICINE

## 2018-10-10 PROCEDURE — 99285 EMERGENCY DEPT VISIT HI MDM: CPT

## 2018-10-10 RX ADMIN — SODIUM CHLORIDE 1000 ML: 0.9 INJECTION, SOLUTION INTRAVENOUS at 10:10

## 2018-10-10 NOTE — DISCHARGE INSTRUCTIONS
Syncope   AMBULATORY CARE:   Syncope  is also called fainting or passing out  Syncope is a sudden, temporary loss of consciousness, followed by a fall from a standing or sitting position  Syncope ranges from not serious to a sign of a more serious condition that needs to be treated  You can control some health conditions that cause syncope  Your healthcare providers can help you create a plan to manage syncope and prevent episodes  Signs and symptoms that may occur before syncope include the following:   · Cold, clammy, and sweaty skin     · Fast breathing and a racing, pounding heartbeat     · Feeling more tired than usual     · Nausea, a warm feeling, and sweating    · A headache, or feeling lightheaded or dizzy    · Tingling sensation or numbness     · Spots in front of your eyes, blurred vision, or double vision  Seek care immediately if:   · You are bleeding because you hit your head when you fainted  · You suddenly have double vision, difficulty speaking, numbness, and cannot move your arms or legs  · You have chest pain and trouble breathing  · You vomit blood or material that looks like coffee grounds  · You see blood in your bowel movement  Contact your healthcare provider if:   · You have new or worsening symptoms  · You have another syncope episode  · You have a headache, fast heartbeat, or feel too dizzy to stand up  · You have questions or concerns about your condition or care  Treatment for syncope  depends on the cause of your syncope  To prevent syncope from happening again, you may  need any of the following:  · Medicines  may be needed to treat any medical conditions that are causing your syncope  These may include medicines to help your heart pump strongly and regularly  Your healthcare provider may also make changes to any medicines that are causing syncope  · Tilt training  involves training yourself to stand for 10 to 30 minutes each day against a wall   This helps your body decrease the effects of posture changes and reduces the number of fainting spells  Manage syncope:   · Keep a record of your syncope episodes  Include your symptoms and your activity before and after the episode  The record can help your healthcare provider find the cause of your syncope and help you manage episodes  · Sit or lie down when needed  This includes when you feel dizzy, your throat is getting tight, and your vision changes  Raise your legs above the level of your heart  · Take slow, deep breaths if you start to breathe faster with anxiety or fear  This can help decrease dizziness and the feeling that you might faint  · Check your blood pressure often  This is important if you take medicine to lower your blood pressure  Check your blood pressure when you are lying down and when you are standing  Ask how often to check during the day  Keep a record of your blood pressure numbers  Your healthcare provider may use the record to help plan your treatment  Prevent a syncope episode:   · Move slowly and let yourself get used to one position before you move to another position  This is very important when you change from a lying or sitting position to a standing position  Take some deep breaths before you stand up from a lying position  Stand up slowly  Sudden movements may cause a fainting spell  Sit on the side of the bed or couch for a few minutes before you stand up  If you are on bedrest, try to be upright for about 2 hours each day, or as directed  Do not lock your legs if you are standing for a long period of time  Move your legs and bend your knees to keep blood flowing  · Follow your healthcare provider's recommendations  Your provider may  recommend that you drink more liquids to prevent dehydration  You may also need to have more salt to keep your blood pressure from dropping too low and causing syncope   Your provider will tell you how much liquid and sodium to have each day  · Watch for signs of low blood sugar  These include hunger, nervousness, sweating, and fast or fluttery heartbeats  Talk with your healthcare provider about ways to keep your blood sugar level steady  · Do not strain if you are constipated  You may faint if you strain to have a bowel movement  Walking is the best way to get your bowels moving  Eat foods high in fiber to make it easier to have a bowel movement  Good examples are high-fiber cereals, beans, vegetables, and whole-grain breads  Prune juice may help make bowel movements softer  · Be careful in hot weather  Heat can cause a syncope episode  Limit activity done outside on hot days  Physical activity in hot weather can lead to dehydration  This can cause an episode  Follow up with your healthcare provider as directed:  Write down your questions so you remember to ask them during your visits  © 2017 [employer]  Information is for End User's use only and may not be sold, redistributed or otherwise used for commercial purposes  All illustrations and images included in CareNotes® are the copyrighted property of [employer]  or [employer]  The above information is an  only  It is not intended as medical advice for individual conditions or treatments  Talk to your doctor, nurse or pharmacist before following any medical regimen to see if it is safe and effective for you  Blurred Vision   WHAT YOU NEED TO KNOW:   Blurred vision is when you cannot see fine details  You may have blurred vision if you are nearsighted or farsighted and you need glasses  Blurred vision may be caused by a corneal abrasion (scratch on the cornea) or a corneal ulcer (open sore)  You may have blurred vision if your eye came into contact with a chemical  A foreign body or infection may also cause blurred vision   Medical conditions, such as cataracts, glaucoma, detached retina, and nerve disorders can also cause blurred vision  Blurred vision may also be caused by a concussion or a tumor  If you have diabetes, you may develop diabetic retinopathy  Diabetic retinopathy damages the blood vessels of your retina  DISCHARGE INSTRUCTIONS:   Return to the emergency department if:   · You have weakness in an arm or leg, difficulty speaking or seeing, and a severe headache  · You have a fever, eye pain, or discharge  · You have a sudden loss of vision  Contact your healthcare provider if:   · Your blurred vision gets worse  · Your blurred vision is worse in the morning  · You have a sudden headache or eye pain  · Your eye has swelling, redness, or discharge  · You see floaters, flashes of light, fine dots, or cobweb shapes  · You have questions or concerns about your condition or care  Medicines: You may  need any of the following:  · Prescription pain medicine  may be given  Ask how to take this medicine safely  · Antibiotics  help prevent or treat an eye infection caused by bacteria  It may be given as eyedrops or an ointment  · Take your medicine as directed  Contact your healthcare provider if you think your medicine is not helping or if you have side effects  Tell him of her if you are allergic to any medicine  Keep a list of the medicines, vitamins, and herbs you take  Include the amounts, and when and why you take them  Bring the list or the pill bottles to follow-up visits  Carry your medicine list with you in case of an emergency  Manage your blurred vision:  Your healthcare provider may ask you to do any of the following:  · Use artificial tears  to keep your eye moist or to soothe your irritated eye  · Apply a cool compress  to decrease any swelling or pain  Wet a clean washcloth with cool water and place it on your eye  Use the cool compress as often as directed  · Wear an eye patch as directed  to protect your eye  Follow up with your healthcare provider as directed:   You may need other eye exams and medicines  Write down your questions so you remember to ask them during your visits  © 2017 [address] [employer] Information is for End User's use only and may not be sold, redistributed or otherwise used for commercial purposes  All illustrations and images included in CareNotes® are the copyrighted property of A D A M , Inc  or [employer]  The above information is an  only  It is not intended as medical advice for individual conditions or treatments  Talk to your doctor, nurse or pharmacist before following any medical regimen to see if it is safe and effective for you  Syncope   WHAT YOU NEED TO KNOW:   Syncope is also called fainting or passing out  Syncope is a sudden, temporary loss of consciousness, followed by a fall from a standing or sitting position  Syncope ranges from not serious to a sign of a more serious condition that needs to be treated  You can control some health conditions that cause syncope  Your healthcare providers can help you create a plan to manage syncope and prevent episodes  DISCHARGE INSTRUCTIONS:   Seek care immediately if:   · You are bleeding because you hit your head when you fainted  · You suddenly have double vision, difficulty speaking, numbness, and cannot move your arms or legs  · You have chest pain and trouble breathing  · You vomit blood or material that looks like coffee grounds  · You see blood in your bowel movement  Contact your healthcare provider if:   · You have new or worsening symptoms  · You have another syncope episode  · You have a headache, fast heartbeat, or feel too dizzy to stand up  · You have questions or concerns about your condition or care  Follow up with your healthcare provider as directed:  Write down your questions so you remember to ask them during your visits  Manage syncope:   · Keep a record of your syncope episodes    Include your symptoms and your activity before and after the episode  The record can help your healthcare provider find the cause of your syncope and help you manage episodes  · Sit or lie down when needed  This includes when you feel dizzy, your throat is getting tight, and your vision changes  Raise your legs above the level of your heart  · Take slow, deep breaths if you start to breathe faster with anxiety or fear  This can help decrease dizziness and the feeling that you might faint  · Check your blood pressure often  This is important if you take medicine to lower your blood pressure  Check your blood pressure when you are lying down and when you are standing  Ask how often to check during the day  Keep a record of your blood pressure numbers  Your healthcare provider may use the record to help plan your treatment  Prevent a syncope episode:   · Move slowly and let yourself get used to one position before you move to another position  This is very important when you change from a lying or sitting position to a standing position  Take some deep breaths before you stand up from a lying position  Stand up slowly  Sudden movements may cause a fainting spell  Sit on the side of the bed or couch for a few minutes before you stand up  If you are on bedrest, try to be upright for about 2 hours each day, or as directed  Do not lock your legs if you are standing for a long period of time  Move your legs and bend your knees to keep blood flowing  · Follow your healthcare provider's recommendations  Your provider may  recommend that you drink more liquids to prevent dehydration  You may also need to have more salt to keep your blood pressure from dropping too low and causing syncope  Your provider will tell you how much liquid and sodium to have each day  · Watch for signs of low blood sugar  These include hunger, nervousness, sweating, and fast or fluttery heartbeats   Talk with your healthcare provider about ways to keep your blood sugar level steady  · Do not strain if you are constipated  You may faint if you strain to have a bowel movement  Walking is the best way to get your bowels moving  Eat foods high in fiber to make it easier to have a bowel movement  Good examples are high-fiber cereals, beans, vegetables, and whole-grain breads  Prune juice may help make bowel movements softer  · Be careful in hot weather  Heat can cause a syncope episode  Limit activity done outside on hot days  Physical activity in hot weather can lead to dehydration  This can cause an episode  © 2017 [facility] [facility] Information is for End User's use only and may not be sold, redistributed or otherwise used for commercial purposes  All illustrations and images included in CareNotes® are the copyrighted property of [employer]  or [employer]  The above information is an  only  It is not intended as medical advice for individual conditions or treatments  Talk to your doctor, nurse or pharmacist before following any medical regimen to see if it is safe and effective for you

## 2018-10-10 NOTE — PROGRESS NOTES
Triage - Obstetrics   Meryle Petite 32 y o  female MRN: [id number]  Unit/Bed#: LD Triage 2 Encounter: [id number]    Assessment/Plan     Assessment:  Vasovagal attack  36 weeks gestation      Plan:  1  Outpatient observation- discussion on hydration, small frequent meals, decrease activities that involve quick changes from sitting to standing, squatting  2  Breech- has appointment for version this week    History of Present Illness   Chief Complaint: Near syncopal episode    HPI:  Meryle Petite is a 32 y o   female with an DARYL of 2018, by Ultrasound at 36w0d weeks gestation who is being admitted for presyncopal episode  Contractions: None  Leakage of fluid: None  Bleeding: None  Fetal movement: present  Has been feeling well, but this AM after her shower felt very warm, flushed weak and vision became blurry  Tried to rest and drink water but symptoms persisted  She came to the Emergency room  Cleared in the ER  Here for NST  Pregnancy complications: Breech, Shinto    Review of Systems   Constitutional: Positive for diaphoresis  Negative for activity change, appetite change, chills, fatigue and fever  HENT: Negative for rhinorrhea, sneezing and sore throat  Eyes: Negative for visual disturbance  Respiratory: Negative for cough, shortness of breath and wheezing  Cardiovascular: Positive for palpitations  Negative for chest pain and leg swelling  Gastrointestinal: Negative for abdominal distention, constipation, diarrhea, nausea and vomiting  Genitourinary: Negative for difficulty urinating  Neurological: Positive for weakness  Negative for syncope and light-headedness          Historical Information   OB History    Para Term  AB Living   2 1 1     1   SAB TAB Ectopic Multiple Live Births           1      # Outcome Date GA Lbr Trung/2nd Weight Sex Delivery Anes PTL Lv   2 Current            1 Term 10/29/14 40w0d  3827 g (8 lb 7 oz) M Vag-Spont EPI N VLADIMIR      Obstetric Comments   Breech presentation   Pregnancy related leg pain, antepartum   Suspected problem with fetal growth not found     Baby complications/comments: Breech  Past Medical History:   Diagnosis Date    Migraine     Normal delivery     2014 son    Sciatica     Thyroid disease     Thyroid dysfunction in pregnancy, antepartum     last assessed 09/02/14    Varicella      Past Surgical History:   Procedure Laterality Date    TOOTH EXTRACTION       Social History   History   Alcohol Use    Yes     Comment: Socially     History   Drug Use No     History   Smoking Status    Never Smoker   Smokeless Tobacco    Never Used     Family History   Problem Relation Age of Onset    Hypertension Mother     Anemia Family     Arthritis Family     Diabetes Family     Hypertension Family     Kidney disease Family     Heart disease Family     No Known Problems Father     Arthritis Sister     No Known Problems Brother     No Known Problems Son     No Known Problems Paternal Grandmother     Kidney disease Maternal Uncle        Meds/Allergies   All medications and allergies reviewed  No Known Allergies    Objective   Vitals: Blood pressure 103/66, pulse 72, temperature 98 °F (36 7 °C), temperature source Oral, resp  rate 18, last menstrual period 01/09/2018, SpO2 100 %, not currently breastfeeding  There is no height or weight on file to calculate BMI  Invasive Devices          No matching active lines, drains, or airways          Physical Exam   Constitutional: She is oriented to person, place, and time  She appears well-developed and well-nourished  No distress  Cardiovascular: Normal rate, regular rhythm and normal heart sounds  Exam reveals no gallop and no friction rub  No murmur heard  Pulmonary/Chest: Effort normal and breath sounds normal  No respiratory distress  Abdominal: Soft  Bowel sounds are normal  She exhibits no distension  There is no tenderness   There is no rebound and no guarding  Neurological: She is alert and oriented to person, place, and time  She has normal reflexes  Skin: She is not diaphoretic  Prenatal Labs: I have personally reviewed pertinent reports  Prenatal Labs: I have personally reviewed pertinent reports  Imaging, Pathology, and Other Studies: I have personally reviewed pertinent reports

## 2018-10-10 NOTE — ED ATTENDING ATTESTATION
Vignesh Ceja MD, saw and evaluated the patient  I have discussed the patient with the resident/non-physician practitioner and agree with the resident's/non-physician practitioner's findings, Plan of Care, and MDM as documented in the resident's/non-physician practitioner's note, except where noted  All available labs and Radiology studies were reviewed  At this point I agree with the current assessment done in the Emergency Department  I have conducted an independent evaluation of this patient a history and physical is as follows: 36 weeks pregnant  Near syncopal episode today  Occurred while getting ready for work  Did not fall  Did not hit head  No chest pain or palpitations  Felt anxious during episode  No abdominal pain  Feeling baby move  No fluid leakage  Had [address] yesterday  Has been having some iron deficiency anemia  No fevers or chills  Flu shot two days ago  No palpitations with this  No recent travel  CN II-XII intact  Heart RRR, lungs CTA, abdomen soft, gravid, nontender  No leg edema        Critical Care Time  CritCare Time    Procedures

## 2018-10-10 NOTE — ED PROVIDER NOTES
History  Chief Complaint   Patient presents with    Weakness - Generalized     Pt presents with genralized weakness after an episode of dizziness and nausea this am  Pt is 36 weeks pregnent  HPI    59-year-old female  39 weeks pregnant presents for evaluation of presyncope  Patient says she was getting ready for work when she felt lightheaded like she was going to pass out  Patient says she had visual blurriness for about 30 seconds  She also notes having nausea and chills   notes she had her eyes closed but was conscious and appeared diaphoretic  Patient and  do not report prior episodes of symptoms before  Patient had an ultrasound done yesterday which revealed baby is breeched  Pregnancy has been uncomplicated but notes she has had issues with iron throughout her pregnancy  Denies abdominal cramping, vaginal discharge or vaginal bleeding  Denies headache, fever, chest pain, palpitations, shortness of breath, vomiting, abdominal pain, diarrhea, dysuria, or edema  No sick contacts or recent travel or immobilization  Prior to Admission Medications   Prescriptions Last Dose Informant Patient Reported? Taking?    Prenatal MV-Min-Fe Fum-FA-DHA (PRENATAL+DHA PO) 10/10/2018 at Unknown time Self Yes Yes   Sig: Take by mouth   ferrous sulfate 324 (65 Fe) mg 10/10/2018 at Unknown time Self No Yes   Sig: Take 1 tablet (324 mg total) by mouth daily for 90 days   levothyroxine 150 mcg tablet 10/10/2018 at Unknown time Self No Yes   Sig: Take 1 tablet (150 mcg total) by mouth daily for 30 days      Facility-Administered Medications: None       Past Medical History:   Diagnosis Date    Migraine     Normal delivery      son    Sciatica     Thyroid disease     Thyroid dysfunction in pregnancy, antepartum     last assessed 14    Varicella        Past Surgical History:   Procedure Laterality Date    TOOTH EXTRACTION         Family History   Problem Relation Age of Onset    Hypertension Mother     Anemia Family     Arthritis Family     Diabetes Family     Hypertension Family     Kidney disease Family     Heart disease Family     No Known Problems Father     Arthritis Sister     No Known Problems Brother     No Known Problems Son     No Known Problems Paternal Grandmother     Kidney disease Maternal Uncle      I have reviewed and agree with the history as documented  Social History   Substance Use Topics    Smoking status: Never Smoker    Smokeless tobacco: Never Used    Alcohol use Yes      Comment: Socially        Review of Systems   Constitutional: Negative for chills, diaphoresis, fatigue and fever  HENT: Negative for congestion, rhinorrhea and sore throat  Eyes: Positive for visual disturbance  Negative for photophobia  Respiratory: Negative for cough, chest tightness and shortness of breath  Cardiovascular: Negative for chest pain and palpitations  Gastrointestinal: Positive for nausea  Negative for abdominal pain, blood in stool, constipation, diarrhea and vomiting  Genitourinary: Negative for dysuria, frequency and hematuria  Musculoskeletal: Negative for back pain, gait problem, myalgias, neck pain and neck stiffness  Skin: Negative for pallor and rash  Neurological: Positive for syncope, weakness and light-headedness  Negative for numbness and headaches  Hematological: Negative for adenopathy  Does not bruise/bleed easily  All other systems reviewed and are negative        Physical Exam  ED Triage Vitals   Temperature Pulse Respirations Blood Pressure SpO2   10/10/18 0845 10/10/18 0845 10/10/18 0845 10/10/18 0845 10/10/18 0845   98 2 °F (36 8 °C) 84 18 98/54 100 %      Temp Source Heart Rate Source Patient Position - Orthostatic VS BP Location FiO2 (%)   10/10/18 1137 10/10/18 1013 10/10/18 1013 10/10/18 1013 --   Oral Monitor Lying Right arm       Pain Score       10/10/18 0845       No Pain           Orthostatic Vital Signs  Vitals:    10/10/18 0845 10/10/18 1013 10/10/18 1137   BP: 98/54 97/56 103/66   Pulse: 84 78 72   Patient Position - Orthostatic VS:  Lying Sitting       Physical Exam   Constitutional: She is oriented to person, place, and time  She appears well-developed and well-nourished  No distress  Patient is alert and oriented, appears well and nontoxic, in no acute distress   HENT:   Head: Normocephalic and atraumatic  Mouth/Throat: Oropharynx is clear and moist  No oropharyngeal exudate  Eyes: Pupils are equal, round, and reactive to light  Conjunctivae and EOM are normal    Neck: Normal range of motion  Neck supple  Cardiovascular: Normal rate, regular rhythm, normal heart sounds and intact distal pulses  Pulmonary/Chest: Effort normal and breath sounds normal  No respiratory distress  Abdominal: Soft  Bowel sounds are normal  She exhibits no distension  There is no tenderness  There is no guarding  Musculoskeletal: Normal range of motion  Lymphadenopathy:     She has no cervical adenopathy  Neurological: She is alert and oriented to person, place, and time  No facial asymmetry noted, CN 2-12 intact, full ROM of upper and lower extremities, muscle strength 5/5 throughout, DTRs normal, sensation intact throughout   Skin: Skin is warm and dry  Capillary refill takes less than 2 seconds  No rash noted  She is not diaphoretic  No erythema  No pallor  Psychiatric: She has a normal mood and affect  Her behavior is normal  Judgment and thought content normal    Nursing note and vitals reviewed  ED Medications  Medications   sodium chloride 0 9 % bolus 1,000 mL (0 mL Intravenous Stopped 10/10/18 1108)       Diagnostic Studies  Results Reviewed     Procedure Component Value Units Date/Time    Urine culture [[id number]] Collected:  10/10/18 1017    Lab Status:   In process Specimen:  Urine from Urine, Clean Catch Updated:  10/10/18 1019    POCT urinalysis dipstick [[id number]]  (Normal) Resulted:  10/10/18 1016    Lab Status: Final result Updated:  10/10/18 1016     Color, UA yellow     Clarity, UA clear    ED Urine Macroscopic [[id number]] Collected:  10/10/18 1017    Lab Status:  Final result Specimen:  Urine Updated:  10/10/18 1016     Color, UA Yellow     Clarity, UA Clear     pH, UA 6 5     Leukocytes, UA Negative     Nitrite, UA Negative     Protein, UA Negative mg/dl      Glucose, UA Negative mg/dl      Ketones, UA Negative mg/dl      Urobilinogen, UA 0 2 E U /dl      Bilirubin, UA Negative     Blood, UA Negative     Specific Gravity, UA 1 010    Narrative:       CLINITEK RESULT    Iron Saturation % [[id number]] Collected:  10/10/18 0900    Lab Status:  Final result Specimen:  Blood from Arm, Left Updated:  10/10/18 1012     Iron Saturation 14 %      TIBC 392 ug/dL      Iron 54 ug/dL     Ferritin [[id number]]  (Normal) Collected:  10/10/18 0900    Lab Status:  Final result Specimen:  Blood from Arm, Left Updated:  10/10/18 1012     Ferritin 12 ng/mL     TSH, 3rd generation with Free T4 reflex [[id number]]  (Normal) Collected:  10/10/18 0900    Lab Status:  Final result Specimen:  Blood from Arm, Left Updated:  10/10/18 1012     TSH 3RD GENERATON 3 320 uIU/mL     Narrative:         Patients undergoing fluorescein dye angiography may retain small amounts of fluorescein in the body for 48-72 hours post procedure  Samples containing fluorescein can produce falsely depressed TSH values  If the patient had this procedure,a specimen should be resubmitted post fluorescein clearance            The recommended reference ranges for TSH during pregnancy are as follows:  First trimester 0 1 to 2 5 uIU/mL  Second trimester  0 2 to 3 0 uIU/mL  Third trimester 0 3 to 3 0 uIU/m      Comprehensive metabolic panel [[id number]]  (Abnormal) Collected:  10/10/18 0900    Lab Status:  Final result Specimen:  Blood from Arm, Left Updated:  10/10/18 0924     Sodium 134 (L) mmol/L      Potassium 3 4 (L) mmol/L      Chloride 106 mmol/L      CO2 21 mmol/L      ANION GAP 7 mmol/L      BUN 9 mg/dL      Creatinine 0 84 mg/dL      Glucose 89 mg/dL      Calcium 8 2 (L) mg/dL      AST 18 U/L      ALT 20 U/L      Alkaline Phosphatase 101 U/L      Total Protein 6 4 g/dL      Albumin 2 6 (L) g/dL      Total Bilirubin 0 19 (L) mg/dL      eGFR 96 ml/min/1 73sq m     Narrative:         National Kidney Disease Education Program recommendations are as follows:  GFR calculation is accurate only with a steady state creatinine  Chronic Kidney disease less than 60 ml/min/1 73 sq  meters  Kidney failure less than 15 ml/min/1 73 sq  meters  CBC and differential [[id number]]  (Abnormal) Collected:  10/10/18 0900    Lab Status:  Final result Specimen:  Blood from Arm, Left Updated:  10/10/18 0909     WBC 10 36 (H) Thousand/uL      RBC 3 55 (L) Million/uL      Hemoglobin 10 7 (L) g/dL      Hematocrit 32 6 (L) %      MCV 92 fL      MCH 30 1 pg      MCHC 32 8 g/dL      RDW 13 7 %      MPV 9 3 fL      Platelets 236 Thousands/uL      nRBC 0 /100 WBCs      Neutrophils Relative 75 %      Immat GRANS % 1 %      Lymphocytes Relative 16 %      Monocytes Relative 7 %      Eosinophils Relative 1 %      Basophils Relative 0 %      Neutrophils Absolute 7 76 (H) Thousands/µL      Immature Grans Absolute 0 12 Thousand/uL      Lymphocytes Absolute 1 64 Thousands/µL      Monocytes Absolute 0 68 Thousand/µL      Eosinophils Absolute 0 12 Thousand/µL      Basophils Absolute 0 04 Thousands/µL                  No orders to display         Procedures  Procedures      Phone Consults  ED Phone Contact    ED Course  ED Course as of Oct 10 1334   Wed Oct 10, 2018   1050 Patient having left lower back pain     1100 Pateint discharged from ED and will be transported upstairs to [facility]                                MDM  Number of Diagnoses or Management Options  Left low back pain:   Nausea:   Pre-syncope:   Visual blurriness:   Diagnosis management comments: 66-year-old female 36 weeks pregnant presents for evaluation of presyncope  Patient appears well and is hemodynamically stable  Will check EKG, CMP, CBC, and urinalysis  Will give patient 1 L of IVF  Bedside ultrasound performed, fetal movement and normal heart noted  Discussed case with OB who recommends patient be transferred upstairs for further monitoring  CritCare Time    Disposition  Final diagnoses:   Pre-syncope   Left low back pain   Nausea   Visual blurriness     Time reflects when diagnosis was documented in both MDM as applicable and the Disposition within this note     Time User Action Codes Description Comment    10/10/2018 10:56 AM Padmini Comment Add [R55] Pre-syncope     10/10/2018 10:56 AM Mattie Lipa [M54 5] Left low back pain     10/10/2018 10:56 AM Padmini Comment Add [R11 0] Nausea     10/10/2018 10:57 AM Padmini Comment Add [H53 8] Visual blurriness       ED Disposition     ED Disposition Condition Comment    Discharge  Colleen Mayes discharge to home/self care      Condition at discharge: Good        Follow-up Information     Follow up With Specialties Details Why Moises Queen MD Obstetrics and Gynecology, Obstetrics, Gynecology Go to If symptoms worsen, As needed [facility] [address] [facility]  [phone]            Discharge Medication List as of 10/10/2018 12:34 PM      CONTINUE these medications which have NOT CHANGED    Details   ferrous sulfate 324 (65 Fe) mg Take 1 tablet (324 mg total) by mouth daily for 90 days, Starting Tue 9/11/2018, Until Mon 12/10/2018, Normal      levothyroxine 150 mcg tablet Take 1 tablet (150 mcg total) by mouth daily for 30 days, Starting Fri 9/21/2018, Until Sun 10/21/2018, Normal      Prenatal MV-Min-Fe Fum-FA-DHA (PRENATAL+DHA PO) Take by mouth, Historical Med             Outpatient Discharge Orders  Activity as tolerated     Call provider for:  persistent nausea or vomiting     Call provider for:  severe uncontrolled pain     Call provider for:  redness, tenderness, or signs of infection (pain, swelling, redness, odor or green/yellow discharge around incision site)     Call provider for: active or persistent bleeding     Call provider for:  difficulty breathing, headache or visual disturbances     Call provider for:  hives     Call provider for:  persistent dizziness or light-headedness     Call provider for:  extreme fatigue     Call provider for:     Other restrictions (specify):         ED Provider  Attending physically available and evaluated Meryle Petite  KYLER managed the patient along with the ED Attending      Electronically Signed by         Gurpreet Culver MD  10/10/18 4634

## 2018-10-10 NOTE — ED PROCEDURE NOTE
Procedure  ECG 12 Lead Documentation  Date/Time: 10/10/2018 9:26 AM  Performed by: Wlima Sampson  Authorized by: Wilma Sampson     Indications / Diagnosis:  Pre-syncope  ECG reviewed by me, the ED Provider: yes    Patient location:  ED and bedside  Previous ECG:     Previous ECG:  Compared to current    Similarity:  No change    Comparison to cardiac monitor: Yes    Interpretation:     Interpretation: normal    Rate:     ECG rate:  79    ECG rate assessment: normal    Rhythm:     Rhythm: sinus rhythm    Ectopy:     Ectopy: none    QRS:     QRS axis:  Normal    QRS intervals:  Normal  Conduction:     Conduction: normal    ST segments:     ST segments:  Normal  T waves:     T waves: normal                         Carmella Nichole MD  10/10/18 1000

## 2018-10-11 LAB — BACTERIA UR CULT: NORMAL

## 2018-10-16 ENCOUNTER — ROUTINE PRENATAL (OUTPATIENT)
Dept: OBGYN CLINIC | Age: 28
End: 2018-10-16

## 2018-10-16 VITALS — BODY MASS INDEX: 32.44 KG/M2 | SYSTOLIC BLOOD PRESSURE: 112 MMHG | DIASTOLIC BLOOD PRESSURE: 60 MMHG | WEIGHT: 189 LBS

## 2018-10-16 DIAGNOSIS — Z3A.36 36 WEEKS GESTATION OF PREGNANCY: ICD-10-CM

## 2018-10-16 DIAGNOSIS — Z34.83 ENCOUNTER FOR SUPERVISION OF OTHER NORMAL PREGNANCY IN THIRD TRIMESTER: Primary | ICD-10-CM

## 2018-10-16 DIAGNOSIS — Z53.1 REFUSAL OF BLOOD TRANSFUSIONS AS PATIENT IS JEHOVAH'S WITNESS: ICD-10-CM

## 2018-10-16 DIAGNOSIS — E03.9 HYPOTHYROIDISM, UNSPECIFIED TYPE: ICD-10-CM

## 2018-10-16 PROCEDURE — 87653 STREP B DNA AMP PROBE: CPT | Performed by: NURSE PRACTITIONER

## 2018-10-16 PROCEDURE — PNV: Performed by: NURSE PRACTITIONER

## 2018-10-16 NOTE — PROGRESS NOTES
Problem List Items Addressed This Visit     Hypothyroidism    Refusal of blood transfusions as patient is Mosque    Supervision of normal pregnancy - Primary    Relevant Orders    Strep B DNA probe, amplification    Breech presentation, no version    36 weeks gestation of pregnancy        Feels well  Denies LOF/CTX/VB  No concerns  Discussed fetal kick counting  Mosque  Scheduled for version 10/18 with ANA peace

## 2018-10-16 NOTE — PATIENT INSTRUCTIONS
Pregnancy at 28 to 38 Weeks   AMBULATORY CARE:   What changes are happening to your body: You are considered full term at the beginning of 37 weeks  Your breathing may be easier if your baby has moved down into a head-down position  You may need to urinate more often because the baby may be pressing on your bladder  You may also feel more discomfort and get tired easily  Seek care immediately if:   · You develop a severe headache that does not go away  · You have new or increased vision changes, such as blurred or spotted vision  · You have new or increased swelling in your face or hands  · You have vaginal spotting or bleeding  · Your water broke or you feel warm water gushing or trickling from your vagina  Contact your healthcare provider if:   · You have more than 5 contractions in 1 hour  · You notice any changes in your baby's movements  · You have abdominal cramps, pressure, or tightening  · You have a change in vaginal discharge  · You have chills or a fever  · You have vaginal itching, burning, or pain  · You have yellow, green, white, or foul-smelling vaginal discharge  · You have pain or burning when you urinate, less urine than usual, or pink or bloody urine  · You have questions or concerns about your condition or care  How to care for yourself at this stage of your pregnancy:   · Eat a variety of healthy foods  Healthy foods include fruits, vegetables, whole-grain breads, low-fat dairy foods, beans, lean meats, and fish  Drink liquids as directed  Ask how much liquid to drink each day and which liquids are best for you  Limit caffeine to less than 200 milligrams each day  Limit your intake of fish to 2 servings each week  Choose fish low in mercury such as canned light tuna, shrimp, crab, salmon, cod, or tilapia  Do not  eat fish high in mercury such as swordfish, tilefish, radha mackerel, and shark  · Take prenatal vitamins as directed    Your need for certain vitamins and minerals, such as folic acid, increases during pregnancy  Prenatal vitamins provide some of the extra vitamins and minerals you need  Prenatal vitamins may also help to decrease the risk of certain birth defects  · Rest as needed  Put your feet up if you have swelling in your ankles and feet  · Do not smoke  If you smoke, it is never too late to quit  Smoking increases your risk of a miscarriage and other health problems during your pregnancy  Smoking can cause your baby to be born too early or weigh less at birth  Ask your healthcare provider for information if you need help quitting  · Do not drink alcohol  Alcohol passes from your body to your baby through the placenta  It can affect your baby's brain development and cause fetal alcohol syndrome (FAS)  FAS is a group of conditions that causes mental, behavior, and growth problems  · Talk to your healthcare provider before you take any medicines  Many medicines may harm your baby if you take them when you are pregnant  Do not take any medicines, vitamins, herbs, or supplements without first talking to your healthcare provider  Never use illegal or street drugs (such as marijuana or cocaine) while you are pregnant  · Talk to your healthcare provider before you travel  You may not be able to travel in an airplane after 36 weeks  He may also recommend that you avoid long road trips  Safety tips during pregnancy:   · Avoid hot tubs and saunas  Do not use a hot tub or sauna while you are pregnant, especially during your first trimester  Hot tubs and saunas may raise your baby's temperature and increase the risk of birth defects  · Avoid toxoplasmosis  This is an infection caused by eating raw meat or being around infected cat feces  It can cause birth defects, miscarriages, and other problems  Wash your hands after you touch raw meat  Make sure any meat is well-cooked before you eat it   Avoid raw eggs and unpasteurized milk  Use gloves or ask someone else to clean your cat's litter box while you are pregnant  · Ask your healthcare provider about travel  The most comfortable time to travel is during the second trimester  Ask your healthcare provider if you can travel after 36 weeks  You may not be able to travel in an airplane after 36 weeks  He may also recommend that you avoid long road trips  Changes that are happening with your baby:  By 38 weeks, your baby may weigh between 6 and 9 pounds  Your baby may be about 14 inches long from the top of the head to the rump (baby's bottom)  Your baby hears well enough to know your voice  As your baby gets larger, you may feel fewer kicks and more stretching and rolling  Your baby may move into a head-down position  Your baby will also rest lower in your abdomen  What you need to know about prenatal care: Your healthcare provider will check your blood pressure and weight  You may also need the following:  · A urine test  may also be done to check for sugar and protein  These can be signs of gestational diabetes or infection  Protein in your urine may also be a sign of preeclampsia  Preeclampsia is a condition that can develop during week 20 or later of your pregnancy  It causes high blood pressure, and it can cause problems with your kidneys and other organs  · A blood test  may be done to check for anemia (low iron level)  · A Tdap vaccine  may be recommended by your healthcare provider  · A group B strep test  is a test that is done to check for group B strep infection  Group B strep is a type of bacteria that may be found in the vagina or rectum  It can be passed to your baby during delivery if you have it  Your healthcare provider will take swab your vagina or rectum and send the sample to the lab for tests  · Fundal height  is a measurement of your uterus to check your baby's growth   This number is usually the same as the number of weeks that you have been pregnant  Your healthcare provider may also check your baby's position  · Your baby's heart rate  will be checked  © 2017 [address] Information is for End User's use only and may not be sold, redistributed or otherwise used for commercial purposes  All illustrations and images included in CareNotes® are the copyrighted property of [employer]  or [employer]  The above information is an  only  It is not intended as medical advice for individual conditions or treatments  Talk to your doctor, nurse or pharmacist before following any medical regimen to see if it is safe and effective for you

## 2018-10-17 ENCOUNTER — ANESTHESIA EVENT (OUTPATIENT)
Dept: LABOR AND DELIVERY | Facility: HOSPITAL | Age: 28
End: 2018-10-17

## 2018-10-18 ENCOUNTER — ANESTHESIA (OUTPATIENT)
Dept: LABOR AND DELIVERY | Facility: HOSPITAL | Age: 28
End: 2018-10-18

## 2018-10-18 ENCOUNTER — HOSPITAL ENCOUNTER (OUTPATIENT)
Facility: HOSPITAL | Age: 28
Discharge: HOME | End: 2018-10-18
Attending: OBSTETRICS & GYNECOLOGY | Admitting: OBSTETRICS & GYNECOLOGY
Payer: COMMERCIAL

## 2018-10-18 VITALS
WEIGHT: 189 LBS | TEMPERATURE: 98.2 F | BODY MASS INDEX: 32.27 KG/M2 | OXYGEN SATURATION: 98 % | SYSTOLIC BLOOD PRESSURE: 93 MMHG | RESPIRATION RATE: 18 BRPM | DIASTOLIC BLOOD PRESSURE: 57 MMHG | HEIGHT: 64 IN | HEART RATE: 89 BPM

## 2018-10-18 PROBLEM — Z3A.37 37 WEEKS GESTATION OF PREGNANCY: Status: ACTIVE | Noted: 2018-10-18

## 2018-10-18 LAB — GP B STREP DNA SPEC QL NAA+PROBE: NORMAL

## 2018-10-18 PROCEDURE — 59412 ANTEPARTUM MANIPULATION: CPT

## 2018-10-18 PROCEDURE — 59412 ANTEPARTUM MANIPULATION: CPT | Performed by: OBSTETRICS & GYNECOLOGY

## 2018-10-18 RX ORDER — SODIUM CHLORIDE, SODIUM LACTATE, POTASSIUM CHLORIDE, CALCIUM CHLORIDE 600; 310; 30; 20 MG/100ML; MG/100ML; MG/100ML; MG/100ML
125 INJECTION, SOLUTION INTRAVENOUS CONTINUOUS
Status: DISCONTINUED | OUTPATIENT
Start: 2018-10-18 | End: 2018-10-18 | Stop reason: HOSPADM

## 2018-10-18 RX ORDER — TERBUTALINE SULFATE 1 MG/ML
0.25 INJECTION, SOLUTION SUBCUTANEOUS ONCE
Status: COMPLETED | OUTPATIENT
Start: 2018-10-18 | End: 2018-10-18

## 2018-10-18 RX ADMIN — SODIUM CHLORIDE, SODIUM LACTATE, POTASSIUM CHLORIDE, AND CALCIUM CHLORIDE 125 ML/HR: .6; .31; .03; .02 INJECTION, SOLUTION INTRAVENOUS at 10:07

## 2018-10-18 RX ADMIN — TERBUTALINE SULFATE 0.25 MG: 1 INJECTION SUBCUTANEOUS at 11:29

## 2018-10-18 NOTE — H&P
H&P Exam - Obstetrics   Robbi Abel 32 y o  female MRN: [id number]  Unit/Bed#: LD PACU-03 Encounter: [id number]    Assessment/Plan     Assessment:    31 yo  at 37 1 weeks here for ECV for Breech presentation  Plan:  IV access  Confirm breech presentation by bedside US  0 2mg of SC terbutaline   Plan for ECV with possible c section should acute complication occur (abruption, SROM, NRFHT)  DC home with IOL at 39 weeks of successful     History of Present Illness     HPI:  Robbi Abel is a 32 y o   female with an DARYL of 2018, by Ultrasound at 37w1d weeks gestation who is being admitted for ECV for Breech presentation  Her current obstetrical history is significant for prior successful ECV on prior pregnancy, obesity, hypothyroid  Contractions: None  Leakage of fluid: None  Bleeding: None  Fetal movement: present  Pregnancy complications:   As above mentioned   Review of Systems   Constitutional: Negative  HENT: Negative  Respiratory: Negative  Cardiovascular: Negative  Gastrointestinal: Negative  Genitourinary: Negative  All other systems reviewed and are negative        Historical Information   OB History    Para Term  AB Living   2 1 1     1   SAB TAB Ectopic Multiple Live Births           1      # Outcome Date GA Lbr Trung/2nd Weight Sex Delivery Anes PTL Lv   2 Current            1 Term 10/29/14 40w0d  3827 g (8 lb 7 oz) M Vag-Spont EPI N VLADIMIR      Obstetric Comments   Breech presentation   Pregnancy related leg pain, antepartum   Suspected problem with fetal growth not found     Baby complications/comments: breech presentation   Past Medical History:   Diagnosis Date    Migraine     Normal delivery     2014 son    Sciatica     Thyroid disease     Thyroid dysfunction in pregnancy, antepartum     last assessed 14    Varicella      Past Surgical History:   Procedure Laterality Date    TOOTH EXTRACTION       Social History History   Alcohol Use    Yes     Comment: Socially     History   Drug Use No     History   Smoking Status    Never Smoker   Smokeless Tobacco    Never Used     Family History: non-contributory    Meds/Allergies   all medications and allergies reviewed  No Known Allergies    Objective   Vitals: Blood pressure 99/59, pulse 83, temperature 98 °F (36 7 °C), temperature source Oral, resp  rate 16, height 5' 4" (1 626 m), weight 85 7 kg (189 lb), last menstrual period 01/09/2018, not currently breastfeeding  Body mass index is 32 44 kg/m²  Invasive Devices     Peripheral Intravenous Line            Peripheral IV 10/18/18 Right Hand less than 1 day                Physical Exam   Constitutional: She is oriented to person, place, and time  She appears well-developed and well-nourished  No distress  HENT:   Head: Normocephalic and atraumatic  Neck: Normal range of motion  Neck supple  Cardiovascular: Normal rate, regular rhythm and normal heart sounds  Exam reveals no gallop and no friction rub  No murmur heard  Pulmonary/Chest: Effort normal and breath sounds normal  No respiratory distress  She has no wheezes  She has no rales  Abdominal: Soft  Bowel sounds are normal    gravid   Genitourinary: Vagina normal and uterus normal    Genitourinary Comments: Breech presentation by TAUS today   Musculoskeletal: Normal range of motion  Neurological: She is alert and oriented to person, place, and time  Skin: She is not diaphoretic  Prenatal Labs:   Blood type: O pos  Antibody screen: Neg   HIV: Neg   Hep B: Neg  GC chlamydia: neg   RPR: Neg   Rubella: Immune   Glucola: 89  GBS: Neg       Imaging, EKG, Pathology, and Other Studies: I have personally reviewed pertinent reports

## 2018-10-18 NOTE — DISCHARGE INSTRUCTIONS
Pregnancy at [address] to [address]:   You are considered full term at the beginning of 37 weeks  Your breathing may be easier if your baby has moved down into a head-down position  You may need to urinate more often because the baby may be pressing on your bladder  You may also feel more discomfort and get tired easily  DISCHARGE INSTRUCTIONS:   Seek care immediately if:   · You develop a severe headache that does not go away  · You have new or increased vision changes, such as blurred or spotted vision  · You have new or increased swelling in your face or hands  · You have vaginal spotting or bleeding  · Your water broke or you feel warm water gushing or trickling from your vagina  Contact your healthcare provider if:   · You have more than 5 contractions in 1 hour  · You notice any changes in your baby's movements  · You have abdominal cramps, pressure, or tightening  · You have a change in vaginal discharge  · You have chills or a fever  · You have vaginal itching, burning, or pain  · You have yellow, green, white, or foul-smelling vaginal discharge  · You have pain or burning when you urinate, less urine than usual, or pink or bloody urine  · You have questions or concerns about your condition or care  How to care for yourself at this stage of your pregnancy:   · Eat a variety of healthy foods  Healthy foods include fruits, vegetables, whole-grain breads, low-fat dairy foods, beans, lean meats, and fish  Drink liquids as directed  Ask how much liquid to drink each day and which liquids are best for you  Limit caffeine to less than 200 milligrams each day  Limit your intake of fish to 2 servings each week  Choose fish low in mercury such as canned light tuna, shrimp, salmon, cod, or tilapia  Do not  eat fish high in mercury such as swordfish, tilefish, radha mackerel, and shark  · Take prenatal vitamins as directed    Your need for certain vitamins and minerals, such as folic acid, increases during pregnancy  Prenatal vitamins provide some of the extra vitamins and minerals you need  Prenatal vitamins may also help to decrease the risk of certain birth defects  · Rest as needed  Put your feet up if you have swelling in your ankles and feet  · Do not smoke  If you smoke, it is never too late to quit  Smoking increases your risk of a miscarriage and other health problems during your pregnancy  Smoking can cause your baby to be born too early or weigh less at birth  Ask your healthcare provider for information if you need help quitting  · Do not drink alcohol  Alcohol passes from your body to your baby through the placenta  It can affect your baby's brain development and cause fetal alcohol syndrome (FAS)  FAS is a group of conditions that causes mental, behavior, and growth problems  · Talk to your healthcare provider before you take any medicines  Many medicines may harm your baby if you take them when you are pregnant  Do not take any medicines, vitamins, herbs, or supplements without first talking to your healthcare provider  Never use illegal or street drugs (such as marijuana or cocaine) while you are pregnant  · Talk to your healthcare provider before you travel  You may not be able to travel in an airplane after 36 weeks  He may also recommend that you avoid long road trips  Safety tips:   · Avoid hot tubs and saunas  Do not use a hot tub or sauna while you are pregnant, especially during your first trimester  Hot tubs and saunas may raise your baby's temperature and increase the risk of birth defects  · Avoid toxoplasmosis  This is an infection caused by eating raw meat or being around infected cat feces  It can cause birth defects, miscarriages, and other problems  Wash your hands after you touch raw meat  Make sure any meat is well-cooked before you eat it  Avoid raw eggs and unpasteurized milk   Use gloves or ask someone else to clean your cat's litter box while you are pregnant  · Ask your healthcare provider about travel  The most comfortable time to travel is during the second trimester  Ask your healthcare provider if you can travel after 36 weeks  You may not be able to travel in an airplane after 36 weeks  He may also recommend that you avoid long road trips  Changes that are happening with your baby:  By 38 weeks, your baby may weigh between 6 and 9 pounds  Your baby may be about 14 inches long from the top of the head to the rump (baby's bottom)  Your baby hears well enough to know your voice  As your baby gets larger, you may feel fewer kicks and more stretching and rolling  Your baby may move into a head-down position  Your baby will also rest lower in your abdomen  What you need to know about prenatal care: Your healthcare provider will check your blood pressure and weight  You may also need the following:  · A urine test  may also be done to check for sugar and protein  These can be signs of gestational diabetes or infection  Protein in your urine may also be a sign of preeclampsia  Preeclampsia is a condition that can develop during week 20 or later of your pregnancy  It causes high blood pressure, and it can cause problems with your kidneys and other organs  · A blood test  may be done to check for anemia (low iron level)  · A Tdap vaccine  may be recommended by your healthcare provider  · A group B strep test  is a test that is done to check for group B strep infection  Group B strep is a type of bacteria that may be found in the vagina or rectum  It can be passed to your baby during delivery if you have it  Your healthcare provider will take swab your vagina or rectum and send the sample to the lab for tests  · Fundal height  is a measurement of your uterus to check your baby's growth  This number is usually the same as the number of weeks that you have been pregnant   Your healthcare provider may also check your baby's position  · Your baby's heart rate  will be checked  © 2017 [address] Information is for End User's use only and may not be sold, redistributed or otherwise used for commercial purposes  All illustrations and images included in CareNotes® are the copyrighted property of A [employer] M [employer]  or [employer]  The above information is an  only  It is not intended as medical advice for individual conditions or treatments  Talk to your doctor, nurse or pharmacist before following any medical regimen to see if it is safe and effective for you

## 2018-10-18 NOTE — PROCEDURES
Astrid Barreto, a Slava Right at 37w1d with an DARYL of 11/7/2018, by Ultrasound, was seen at [address] [place] [address] for the following procedure(s):   external cephalic version  Patient was identified in the PACU  She underwent fetal monitoring  She had placement of IV access  Abdominal ultrasound demonstrated a complete breech presentation with the fetal head in the maternal right upper quadrant  The fetal back was along the left lateral abdomen  Patient received subcutaneous terbutaline 0 25 mg  Operative consent had been obtained and a operative time-out was accomplished  Using ultrasound guidance and gel on the abdomen, the fetal breech was elevated out of the pelvis and a forward roll was accomplished with out difficulty  Fetal heart was visualized throughout the procedure and was noted to remain in the 140-150 range throughout  Postprocedural ultrasound demonstrated the baby to now be in vertex presentation  The patient will have extended monitoring and will be discharged if reassuring  She has follow-up arranged in the office  Postprocedural precautions were reviewed    Patient is blood type Rh positive therefore RhoGAM was not indicated     ]

## 2018-10-23 ENCOUNTER — ROUTINE PRENATAL (OUTPATIENT)
Dept: OBGYN CLINIC | Age: 28
End: 2018-10-23

## 2018-10-23 VITALS
WEIGHT: 191.13 LBS | SYSTOLIC BLOOD PRESSURE: 112 MMHG | BODY MASS INDEX: 32.81 KG/M2 | DIASTOLIC BLOOD PRESSURE: 64 MMHG

## 2018-10-23 DIAGNOSIS — Z34.83 ENCOUNTER FOR SUPERVISION OF OTHER NORMAL PREGNANCY, THIRD TRIMESTER: ICD-10-CM

## 2018-10-23 DIAGNOSIS — O99.280 THYROID DYSFUNCTION IN PREGNANCY, ANTEPARTUM: ICD-10-CM

## 2018-10-23 DIAGNOSIS — Z3A.35 35 WEEKS GESTATION OF PREGNANCY: ICD-10-CM

## 2018-10-23 DIAGNOSIS — Z3A.37 37 WEEKS GESTATION OF PREGNANCY: ICD-10-CM

## 2018-10-23 DIAGNOSIS — Z53.1 REFUSAL OF BLOOD TRANSFUSIONS AS PATIENT IS JEHOVAH'S WITNESS: Primary | ICD-10-CM

## 2018-10-23 DIAGNOSIS — E07.9 THYROID DYSFUNCTION IN PREGNANCY, ANTEPARTUM: ICD-10-CM

## 2018-10-23 PROBLEM — Z34.90 SUPERVISION OF NORMAL PREGNANCY: Status: RESOLVED | Noted: 2018-08-27 | Resolved: 2018-10-23

## 2018-10-23 PROBLEM — Z3A.36 36 WEEKS GESTATION OF PREGNANCY: Status: RESOLVED | Noted: 2018-10-10 | Resolved: 2018-10-23

## 2018-10-23 PROCEDURE — PNV: Performed by: OBSTETRICS & GYNECOLOGY

## 2018-10-23 NOTE — PATIENT INSTRUCTIONS
Early Labor Signs   GENERAL INFORMATION:   What are early labor signs? Early labor signs are changes in your body that allow your baby to pass through your birth canal   What are the signs and symptoms of early labor? · Lightening  occurs when your baby drops inside your pelvis  You may feel increased pressure in your pelvis  This may happen a few weeks to a few hours before your labor begins  · Contractions  are cramps and tightening that occur in your uterus to help move the baby through your birth canal  Contractions occur regularly and more often each time  Each one lasts about 30 to 70 seconds, and gets stronger until you deliver your baby  Contractions do not go away with movement  The pain starts in your lower back and moves to your abdomen  · Effacement  occurs when your cervix softens and thins, so it can easily open for the baby  Your caregiver will examine your cervix for effacement  · Dilation  is widening of your cervix, also for the baby's passage  Your caregiver will examine your cervix for dilation  Your cervix will be fully opened and ready for delivery when it is dilated to 10 centimeters  · Increased discharge  from your vagina may occur  It may be pink, clear, or slightly bloody  This discharge may also be called bloody show  Bloody show is a mucus plug that forms and blocks your cervix during pregnancy  · Rupture of membranes  is a sudden release of clear fluid from your vagina  It is also known as when your water breaks  Your caregiver may need to break your water if it does not break on its own  What is false labor? You may have false labor signs, which are also called Hardesty Saunders contractions  False labor is common and may happen several weeks or days before your actual labor  The contractions are not regular, and do not get closer together  The pain is usually mild, does not worsen, and is felt only in front   Jason Saunders contractions may happen later in the day, and stop after you change position, walk, or rest   When should I contact my caregiver? · You have pain in your lower back or abdomen  · You have bloody mucus or show  · You have questions or concerns about your condition or care  When should I seek immediate care or call 911? · You have regular, painful contractions that are less than 5 minutes apart and last 30 to 70 seconds each  · You have heavy vaginal bleeding  · You have a constant trickle or sudden gush of clear fluid from your vagina  · You notice a sudden decrease in your baby's movement  CARE AGREEMENT:   You have the right to help plan your care  Learn about your health condition and how it may be treated  Discuss treatment options with your caregivers to decide what care you want to receive  You always have the right to refuse treatment  The above information is an  only  It is not intended as medical advice for individual conditions or treatments  Talk to your doctor, nurse or pharmacist before following any medical regimen to see if it is safe and effective for you  © 2014 [address] is for End User's use only and may not be sold, redistributed or otherwise used for commercial purposes  All illustrations and images included in CareNotes® are the copyrighted property of [employer]  or [employer]

## 2018-10-23 NOTE — PROGRESS NOTES
Problem List Items Addressed This Visit        Endocrine    Thyroid dysfunction in pregnancy, antepartum       Other    Refusal of blood transfusions as patient is Hinduism - Primary    Encounter for supervision of other normal pregnancy, third trimester     S/p Version last week  GBS negative  Awaiting labor    Signs of labor reviewed, fetal kick counts discussed         RESOLVED: 35 weeks gestation of pregnancy    RESOLVED: 37 weeks gestation of pregnancy

## 2018-10-23 NOTE — ASSESSMENT & PLAN NOTE
S/p Version last week  GBS negative  Awaiting labor    Signs of labor reviewed, fetal kick counts discussed

## 2018-11-02 ENCOUNTER — ROUTINE PRENATAL (OUTPATIENT)
Dept: OBGYN CLINIC | Facility: MEDICAL CENTER | Age: 28
End: 2018-11-02

## 2018-11-02 VITALS — DIASTOLIC BLOOD PRESSURE: 64 MMHG | SYSTOLIC BLOOD PRESSURE: 110 MMHG | WEIGHT: 196 LBS | BODY MASS INDEX: 33.64 KG/M2

## 2018-11-02 DIAGNOSIS — Z3A.39 39 WEEKS GESTATION OF PREGNANCY: Primary | ICD-10-CM

## 2018-11-02 PROCEDURE — PNV: Performed by: OBSTETRICS & GYNECOLOGY

## 2018-11-02 NOTE — PROGRESS NOTES
Patient says it is getting more difficult to walk around, a lot of soreness  Denies leaking of fluid, not having regular contractions, she reports good fetal movement

## 2018-11-02 NOTE — PROGRESS NOTES
Problem List Items Addressed This Visit        Other    39 weeks gestation of pregnancy - Primary     Feels well  S/P successful ECV last week  Rare ctx  Good FM    Discussed IOL by 41 weeks               Limited AUS done today to confirm presentation

## 2018-11-06 ENCOUNTER — TELEPHONE (OUTPATIENT)
Dept: OBGYN CLINIC | Facility: CLINIC | Age: 28
End: 2018-11-06

## 2018-11-06 ENCOUNTER — ROUTINE PRENATAL (OUTPATIENT)
Dept: OBGYN CLINIC | Age: 28
End: 2018-11-06

## 2018-11-06 VITALS
SYSTOLIC BLOOD PRESSURE: 102 MMHG | DIASTOLIC BLOOD PRESSURE: 64 MMHG | WEIGHT: 198.31 LBS | BODY MASS INDEX: 34.04 KG/M2

## 2018-11-06 DIAGNOSIS — O99.280 THYROID DYSFUNCTION IN PREGNANCY, ANTEPARTUM: ICD-10-CM

## 2018-11-06 DIAGNOSIS — Z34.83 ENCOUNTER FOR SUPERVISION OF OTHER NORMAL PREGNANCY, THIRD TRIMESTER: ICD-10-CM

## 2018-11-06 DIAGNOSIS — E07.9 THYROID DYSFUNCTION IN PREGNANCY, ANTEPARTUM: ICD-10-CM

## 2018-11-06 PROBLEM — Z3A.39 39 WEEKS GESTATION OF PREGNANCY: Status: RESOLVED | Noted: 2018-11-02 | Resolved: 2018-11-06

## 2018-11-06 PROCEDURE — PNV: Performed by: OBSTETRICS & GYNECOLOGY

## 2018-11-06 NOTE — TELEPHONE ENCOUNTER
----- Message from Kiley Nguyen MD sent at 11/6/2018  3:46 PM EST -----  Please call Ivett Swanson- I was able to schedule her for induction for this Friday 11/9/18 7am

## 2018-11-06 NOTE — PROGRESS NOTES
Problem List Items Addressed This Visit        Endocrine    Thyroid dysfunction in pregnancy, antepartum       Other    Encounter for supervision of other normal pregnancy, third trimester     Signs of labor and fetal kick counts discussed  GBS negative    Induction scheduled for 11/9/18

## 2018-11-09 ENCOUNTER — HOSPITAL ENCOUNTER (OUTPATIENT)
Dept: LABOR AND DELIVERY | Facility: HOSPITAL | Age: 28
Discharge: HOME | End: 2018-11-09
Payer: COMMERCIAL

## 2018-11-09 ENCOUNTER — ANESTHESIA EVENT (INPATIENT)
Dept: LABOR AND DELIVERY | Facility: HOSPITAL | Age: 28
End: 2018-11-09
Payer: COMMERCIAL

## 2018-11-09 ENCOUNTER — ANESTHESIA (INPATIENT)
Dept: LABOR AND DELIVERY | Facility: HOSPITAL | Age: 28
End: 2018-11-09
Payer: COMMERCIAL

## 2018-11-09 ENCOUNTER — HOSPITAL ENCOUNTER (INPATIENT)
Facility: HOSPITAL | Age: 28
LOS: 2 days | Discharge: HOME | End: 2018-11-11
Attending: OBSTETRICS & GYNECOLOGY | Admitting: OBSTETRICS & GYNECOLOGY
Payer: COMMERCIAL

## 2018-11-09 DIAGNOSIS — Z3A.40 40 WEEKS GESTATION OF PREGNANCY: Primary | ICD-10-CM

## 2018-11-09 LAB
ABO GROUP BLD: NORMAL
BASE EXCESS BLDCOA CALC-SCNC: -8.5 MMOL/L (ref 3–11)
BASE EXCESS BLDCOV CALC-SCNC: -4.8 MMOL/L (ref 1–9)
BLD GP AB SCN SERPL QL: NEGATIVE
ERYTHROCYTE [DISTWIDTH] IN BLOOD BY AUTOMATED COUNT: 13.9 % (ref 11.6–15.1)
HCO3 BLDCOA-SCNC: 20.3 MMOL/L (ref 17.3–27.3)
HCO3 BLDCOV-SCNC: 20.8 MMOL/L (ref 12.2–28.6)
HCT VFR BLD AUTO: 34.8 % (ref 34.8–46.1)
HGB BLD-MCNC: 11.7 G/DL (ref 11.5–15.4)
MCH RBC QN AUTO: 30.5 PG (ref 26.8–34.3)
MCHC RBC AUTO-ENTMCNC: 33.6 G/DL (ref 31.4–37.4)
MCV RBC AUTO: 91 FL (ref 82–98)
O2 CT VFR BLDCOA CALC: 11.7 ML/DL
OXYHGB MFR BLDCOA: 52.8 %
OXYHGB MFR BLDCOV: 86 %
PCO2 BLDCOA: 54.7 MM[HG] (ref 30–60)
PCO2 BLDCOV: 40.1 MM HG (ref 27–43)
PH BLDCOA: 7.19 [PH] (ref 7.23–7.43)
PH BLDCOV: 7.33 [PH] (ref 7.19–7.49)
PLATELET # BLD AUTO: 229 THOUSANDS/UL (ref 149–390)
PMV BLD AUTO: 9.6 FL (ref 8.9–12.7)
PO2 BLDCOA: 26.9 MM HG (ref 5–25)
PO2 BLDCOV: 43.5 MM HG (ref 15–45)
RBC # BLD AUTO: 3.84 MILLION/UL (ref 3.81–5.12)
RH BLD: POSITIVE
SAO2 % BLDCOV: 18.3 ML/DL
SPECIMEN EXPIRATION DATE: NORMAL
WBC # BLD AUTO: 13.08 THOUSAND/UL (ref 4.31–10.16)

## 2018-11-09 PROCEDURE — 10907ZC DRAINAGE OF AMNIOTIC FLUID, THERAPEUTIC FROM PRODUCTS OF CONCEPTION, VIA NATURAL OR ARTIFICIAL OPENING: ICD-10-PCS | Performed by: OBSTETRICS & GYNECOLOGY

## 2018-11-09 PROCEDURE — 59400 OBSTETRICAL CARE: CPT | Performed by: OBSTETRICS & GYNECOLOGY

## 2018-11-09 PROCEDURE — 85027 COMPLETE CBC AUTOMATED: CPT | Performed by: STUDENT IN AN ORGANIZED HEALTH CARE EDUCATION/TRAINING PROGRAM

## 2018-11-09 PROCEDURE — 86900 BLOOD TYPING SEROLOGIC ABO: CPT | Performed by: STUDENT IN AN ORGANIZED HEALTH CARE EDUCATION/TRAINING PROGRAM

## 2018-11-09 PROCEDURE — 4A1HXCZ MONITORING OF PRODUCTS OF CONCEPTION, CARDIAC RATE, EXTERNAL APPROACH: ICD-10-PCS | Performed by: OBSTETRICS & GYNECOLOGY

## 2018-11-09 PROCEDURE — 3E033VJ INTRODUCTION OF OTHER HORMONE INTO PERIPHERAL VEIN, PERCUTANEOUS APPROACH: ICD-10-PCS | Performed by: OBSTETRICS & GYNECOLOGY

## 2018-11-09 PROCEDURE — 86901 BLOOD TYPING SEROLOGIC RH(D): CPT | Performed by: STUDENT IN AN ORGANIZED HEALTH CARE EDUCATION/TRAINING PROGRAM

## 2018-11-09 PROCEDURE — 86592 SYPHILIS TEST NON-TREP QUAL: CPT | Performed by: STUDENT IN AN ORGANIZED HEALTH CARE EDUCATION/TRAINING PROGRAM

## 2018-11-09 PROCEDURE — 82805 BLOOD GASES W/O2 SATURATION: CPT | Performed by: OBSTETRICS & GYNECOLOGY

## 2018-11-09 PROCEDURE — 0UC97ZZ EXTIRPATION OF MATTER FROM UTERUS, VIA NATURAL OR ARTIFICIAL OPENING: ICD-10-PCS | Performed by: OBSTETRICS & GYNECOLOGY

## 2018-11-09 PROCEDURE — 86850 RBC ANTIBODY SCREEN: CPT | Performed by: STUDENT IN AN ORGANIZED HEALTH CARE EDUCATION/TRAINING PROGRAM

## 2018-11-09 RX ORDER — OXYTOCIN/RINGER'S LACTATE 30/500 ML
62.5 PLASTIC BAG, INJECTION (ML) INTRAVENOUS CONTINUOUS
Status: DISPENSED | OUTPATIENT
Start: 2018-11-09 | End: 2018-11-10

## 2018-11-09 RX ORDER — LIDOCAINE HYDROCHLORIDE AND EPINEPHRINE 15; 5 MG/ML; UG/ML
INJECTION, SOLUTION EPIDURAL AS NEEDED
Status: DISCONTINUED | OUTPATIENT
Start: 2018-11-09 | End: 2018-11-09 | Stop reason: SURG

## 2018-11-09 RX ORDER — FERROUS SULFATE 325(65) MG
325 TABLET ORAL
Status: DISCONTINUED | OUTPATIENT
Start: 2018-11-09 | End: 2018-11-09

## 2018-11-09 RX ORDER — METOCLOPRAMIDE HYDROCHLORIDE 5 MG/ML
5 INJECTION INTRAMUSCULAR; INTRAVENOUS EVERY 6 HOURS PRN
Status: DISCONTINUED | OUTPATIENT
Start: 2018-11-09 | End: 2018-11-09

## 2018-11-09 RX ORDER — ACETAMINOPHEN 325 MG/1
650 TABLET ORAL EVERY 6 HOURS PRN
Status: DISCONTINUED | OUTPATIENT
Start: 2018-11-09 | End: 2018-11-11 | Stop reason: HOSPADM

## 2018-11-09 RX ORDER — ROPIVACAINE HYDROCHLORIDE 2 MG/ML
INJECTION, SOLUTION EPIDURAL; INFILTRATION; PERINEURAL CONTINUOUS PRN
Status: DISCONTINUED | OUTPATIENT
Start: 2018-11-09 | End: 2018-11-09 | Stop reason: SURG

## 2018-11-09 RX ORDER — SODIUM CHLORIDE, SODIUM LACTATE, POTASSIUM CHLORIDE, CALCIUM CHLORIDE 600; 310; 30; 20 MG/100ML; MG/100ML; MG/100ML; MG/100ML
125 INJECTION, SOLUTION INTRAVENOUS CONTINUOUS
Status: DISCONTINUED | OUTPATIENT
Start: 2018-11-09 | End: 2018-11-09

## 2018-11-09 RX ORDER — DOCUSATE SODIUM 100 MG/1
100 CAPSULE, LIQUID FILLED ORAL 2 TIMES DAILY
Status: DISCONTINUED | OUTPATIENT
Start: 2018-11-09 | End: 2018-11-11 | Stop reason: HOSPADM

## 2018-11-09 RX ORDER — OXYTOCIN/RINGER'S LACTATE 30/500 ML
1-30 PLASTIC BAG, INJECTION (ML) INTRAVENOUS
Status: DISCONTINUED | OUTPATIENT
Start: 2018-11-09 | End: 2018-11-09

## 2018-11-09 RX ORDER — LEVOTHYROXINE SODIUM 0.07 MG/1
150 TABLET ORAL
Status: DISCONTINUED | OUTPATIENT
Start: 2018-11-10 | End: 2018-11-11 | Stop reason: HOSPADM

## 2018-11-09 RX ORDER — IBUPROFEN 600 MG/1
600 TABLET ORAL EVERY 6 HOURS PRN
Status: DISCONTINUED | OUTPATIENT
Start: 2018-11-09 | End: 2018-11-11 | Stop reason: HOSPADM

## 2018-11-09 RX ORDER — OXYCODONE HYDROCHLORIDE AND ACETAMINOPHEN 5; 325 MG/1; MG/1
1 TABLET ORAL ONCE
Status: COMPLETED | OUTPATIENT
Start: 2018-11-09 | End: 2018-11-09

## 2018-11-09 RX ORDER — CALCIUM CARBONATE 200(500)MG
1000 TABLET,CHEWABLE ORAL DAILY PRN
Status: DISCONTINUED | OUTPATIENT
Start: 2018-11-09 | End: 2018-11-11 | Stop reason: HOSPADM

## 2018-11-09 RX ORDER — LEVOTHYROXINE SODIUM 0.07 MG/1
150 TABLET ORAL DAILY
Status: DISCONTINUED | OUTPATIENT
Start: 2018-11-09 | End: 2018-11-09

## 2018-11-09 RX ORDER — ONDANSETRON 2 MG/ML
4 INJECTION INTRAMUSCULAR; INTRAVENOUS EVERY 4 HOURS PRN
Status: DISCONTINUED | OUTPATIENT
Start: 2018-11-09 | End: 2018-11-09

## 2018-11-09 RX ORDER — ROPIVACAINE HYDROCHLORIDE 2 MG/ML
INJECTION, SOLUTION EPIDURAL; INFILTRATION; PERINEURAL AS NEEDED
Status: DISCONTINUED | OUTPATIENT
Start: 2018-11-09 | End: 2018-11-09 | Stop reason: SURG

## 2018-11-09 RX ADMIN — ROPIVACAINE HYDROCHLORIDE 5 ML: 2 INJECTION, SOLUTION EPIDURAL; INFILTRATION at 11:48

## 2018-11-09 RX ADMIN — IBUPROFEN 600 MG: 600 TABLET, FILM COATED ORAL at 19:31

## 2018-11-09 RX ADMIN — SODIUM CHLORIDE, SODIUM LACTATE, POTASSIUM CHLORIDE, AND CALCIUM CHLORIDE 999 ML/HR: .6; .31; .03; .02 INJECTION, SOLUTION INTRAVENOUS at 09:10

## 2018-11-09 RX ADMIN — ROPIVACAINE HYDROCHLORIDE 5 ML: 2 INJECTION, SOLUTION EPIDURAL; INFILTRATION at 11:45

## 2018-11-09 RX ADMIN — LEVOTHYROXINE SODIUM 150 MCG: 75 TABLET ORAL at 09:07

## 2018-11-09 RX ADMIN — Medication 62.5 MILLI-UNITS/MIN: at 20:25

## 2018-11-09 RX ADMIN — DOCUSATE SODIUM 100 MG: 100 CAPSULE, LIQUID FILLED ORAL at 19:31

## 2018-11-09 RX ADMIN — Medication 2 MILLI-UNITS/MIN: at 09:10

## 2018-11-09 RX ADMIN — LIDOCAINE HYDROCHLORIDE AND EPINEPHRINE 3 ML: 15; 5 INJECTION, SOLUTION EPIDURAL at 11:43

## 2018-11-09 RX ADMIN — BENZOCAINE AND LEVOMENTHOL: 200; 5 SPRAY TOPICAL at 19:31

## 2018-11-09 RX ADMIN — ROPIVACAINE HYDROCHLORIDE: 2 INJECTION, SOLUTION EPIDURAL; INFILTRATION at 18:19

## 2018-11-09 RX ADMIN — ROPIVACAINE HYDROCHLORIDE: 2 INJECTION, SOLUTION EPIDURAL; INFILTRATION at 11:54

## 2018-11-09 RX ADMIN — SODIUM CHLORIDE, SODIUM LACTATE, POTASSIUM CHLORIDE, AND CALCIUM CHLORIDE 125 ML/HR: .6; .31; .03; .02 INJECTION, SOLUTION INTRAVENOUS at 11:56

## 2018-11-09 RX ADMIN — SODIUM CHLORIDE, SODIUM LACTATE, POTASSIUM CHLORIDE, AND CALCIUM CHLORIDE 125 ML/HR: .6; .31; .03; .02 INJECTION, SOLUTION INTRAVENOUS at 17:17

## 2018-11-09 RX ADMIN — OXYCODONE HYDROCHLORIDE AND ACETAMINOPHEN 1 TABLET: 5; 325 TABLET ORAL at 22:22

## 2018-11-09 RX ADMIN — ROPIVACAINE HYDROCHLORIDE 10 ML/HR: 2 INJECTION, SOLUTION EPIDURAL; INFILTRATION at 11:50

## 2018-11-09 NOTE — ANESTHESIA PREPROCEDURE EVALUATION
Review of Systems/Medical History  Patient summary reviewed  Chart reviewed      Cardiovascular  Negative cardio ROS Exercise tolerance (METS): >4,     Pulmonary  Negative pulmonary ROS        GI/Hepatic  Negative GI/hepatic ROS          Negative  ROS        Endo/Other  History of thyroid disease , hypothyroidism,      GYN  Currently pregnant , Prior pregnancy/OB history : 2 Parity: 1,          Hematology   Musculoskeletal       Neurology    Headaches,    Psychology   Negative psychology ROS            Lab Results   Component Value Date    WBC 13 08 (H) 2018    HGB 11 7 2018    HCT 34 8 2018    MCV 91 2018     2018     Lab Results   Component Value Date     2017    K 3 4 (L) 10/10/2018    CO2 21 10/10/2018     10/10/2018    BUN 9 10/10/2018    CREATININE 0 84 10/10/2018     No results found for: INR, PROTIME  No results found for: PTT    Lab Results   Component Value Date    GLUCOSE 81 2017    GLUCOSE 78 2016    GLUCOSE 83 2014       No results found for: HGBA1C    Type and Screen:  O      Physical Exam    Airway    Mallampati score: I  TM Distance: >3 FB  Neck ROM: full     Dental       Cardiovascular  Comment: Negative ROS, Rhythm: regular, Rate: normal,     Pulmonary  Breath sounds clear to auscultation,     Other Findings        Anesthesia Plan  ASA Score- 2     Anesthesia Type- epidural with ASA Monitors  Additional Monitors:   Airway Plan:     Comment: Orthodoxy  Plan Factors-    Induction-     Postoperative Plan-     Informed Consent- Anesthetic plan and risks discussed with patient

## 2018-11-09 NOTE — OB LABOR/OXYTOCIN SAFETY PROGRESS
Oxytocin Safety Progress Check Note - John Salguero 32 y o  female MRN: [id number]    Unit/Bed#: -01 Encounter: [id number]    Obstetric History       T1      L1     SAB0   TAB0   Ectopic0   Multiple0   Live Births1    Obstetric Comments   Breech presentation   Pregnancy related leg pain, antepartum   Suspected problem with fetal growth not found     Gestational Age: 41w4d  Dose (jean-units/min) Oxytocin: 14 jean-units/min  Contraction Frequency (minutes): (P) 2-2 5  Contraction Quality: (P) Mild  Tachysystole: (P) No   Dilation: 4        Effacement (%): 70  Station: -2  Baseline Rate: (P) 135 bpm  Fetal Heart Rate: 129 BPM  FHR Category: (P) Category II          Notes/comments:   Patient has right sided window from epidural    Cervical exam with some cange    Continuing to leak clear fluid    FHT category 1, christos every 2 minutes          Doe Ibarra MD 2018 3:07 PM

## 2018-11-09 NOTE — OB LABOR/OXYTOCIN SAFETY PROGRESS
Oxytocin Safety Progress Check Note - Josemanuel Cargo 32 y o  female MRN: [id number]    Unit/Bed#: -01 Encounter: [id number]    Obstetric History       T1      L1     SAB0   TAB0   Ectopic0   Multiple0   Live Births1    Obstetric Comments   Breech presentation   Pregnancy related leg pain, antepartum   Suspected problem with fetal growth not found     Gestational Age: 41w4d  Dose (jean-units/min) Oxytocin: 12 jean-units/min  Contraction Frequency (minutes): 2  Contraction Quality: Mild  Tachysystole: No   Dilation: 3        Effacement (%): 80  Station: -2  Baseline Rate: 120 bpm  Fetal Heart Rate: 129 BPM  FHR Category: Category I          Notes/comments:   Cervical exam unchanged  AROM for clear fluid       [address] Mark Johnson MD 2018 1:09 PM

## 2018-11-09 NOTE — PLAN OF CARE
BIRTH - VAGINAL/ SECTION     Fetal and maternal status remain reassuring during the birth process [de-identified]     Emotionally satisfying birthing experience for mother/fetus [address] Discharge to home or other facility with appropriate resources Progressing        INFECTION - ADULT     Absence or prevention of progression during hospitalization Progressing     Absence of fever/infection during neutropenic period Progressing        Knowledge Deficit     Patient/family/caregiver demonstrates understanding of disease process, treatment plan, medications, and discharge instructions Progressing        Labor & Delivery     Manages discomfort Progressing     Patient vital signs are stable Progressing        PAIN - ADULT     Verbalizes/displays adequate comfort level or baseline comfort level Progressing        SAFETY ADULT     Patient will remain free of falls Progressing     Maintain or return to baseline ADL function Progressing     Maintain or return mobility status to optimal level Progressing

## 2018-11-09 NOTE — ANESTHESIA PROCEDURE NOTES
Epidural Block    Patient location during procedure: OB  Start time: 11/9/2018 11:43 AM  Reason for block: procedure for pain  Staffing  Anesthesiologist: Aaliyah Astudillo  Performed: anesthesiologist   Preanesthetic Checklist  Completed: patient identified, surgical consent, pre-op evaluation, timeout performed, IV checked, risks and benefits discussed and monitors and equipment checked  Epidural  Patient position: sitting  Prep: ChloraPrep  Patient monitoring: continuous pulse ox and frequent blood pressure checks  Approach: midline  Location: lumbar (1-5)  Injection technique: SG saline  Needle  Needle type: Tuohy   Needle gauge: 18 G  Catheter type: side hole  Catheter size: 20 G  Catheter at skin depth: 10 cm  Test dose: negative and lidocaine 1 5% with epinephrine 1-to-200,000negative aspiration for CSF, negative aspiration for heme and no paresthesia on injection

## 2018-11-09 NOTE — OB LABOR/OXYTOCIN SAFETY PROGRESS
Oxytocin Safety Progress Check Note - Colleen Mayes 32 y o  female MRN: [id number]    Unit/Bed#: -01 Encounter: [id number]    Obstetric History       T1      L1     SAB0   TAB0   Ectopic0   Multiple0   Live Births1    Obstetric Comments   Breech presentation   Pregnancy related leg pain, antepartum   Suspected problem with fetal growth not found     Gestational Age: 41w4d  Dose (jean-units/min) Oxytocin: 8 jean-units/min  Contraction Frequency (minutes): 1 5-7  Contraction Quality: Mild  Tachysystole: No   Dilation: 3        Effacement (%): 80  Station: -2  Baseline Rate: 125 bpm  Fetal Heart Rate: 135 BPM  FHR Category: Category I          Notes/comments:   Patient states she is not really feeling her contractions, however they are externally palpable    Cervical exam as above, mostly unchanged    fetal heart tracing category 1, accelerations no decelerations  Praneeth every 2-3 minutes    Cynthia May MD 2018 10:46 AM

## 2018-11-09 NOTE — OB LABOR/OXYTOCIN SAFETY PROGRESS
Oxytocin Safety Progress Check Note - Mark Gonzalez 32 y o  female MRN: [id number]    Unit/Bed#: -01 Encounter: [id number]    Obstetric History       T1      L1     SAB0   TAB0   Ectopic0   Multiple0   Live Births1    Obstetric Comments   Breech presentation   Pregnancy related leg pain, antepartum   Suspected problem with fetal growth not found     Gestational Age: 41w4d  Dose (jean-units/min) Oxytocin: 14 jean-units/min  Contraction Frequency (minutes): 1 5-2  Contraction Quality: Moderate  Tachysystole: No   Dilation: 6        Effacement (%): 90  Station: -1  Baseline Rate: 145 bpm  Fetal Heart Rate: 129 BPM  FHR Category: Category I          Notes/comments:   Patient having intermittent lates and variables  Checked and found to have made cervical change  Decelerations resolved with maternal positioning and oxygen supplementations  Currently category 1 strip, previously category 2      Praneeth every 2-3 minutes          David Deleon MD 2018 5:13 PM

## 2018-11-09 NOTE — H&P
H&P Exam - Obstetrics   Janita Schirmer 32 y o  female MRN: [id number]  Unit/Bed#: -01 Encounter: [id number]      History of Present Illness     Chief Complaint: Induction of labor    HPI:  Janita Schirmer is a 32 y o   female with an DARYL of 2018, by Ultrasound at 40w2d weeks gestation who is being admitted for elective induction of labor  She is s/p a successful external cephalic version during this pregnancy and is confirmed vertex today by US  Contractions: None today  Loss of fluid: No  Vaginal bleeding: No  Fetal movement: yes    She is SLOGA patient  PREGNANCY COMPLICATIONS:   1) Hypothyroidism  2) Adventism, refusal of blood products    OB History    Para Term  AB Living   2 1 1     1   SAB TAB Ectopic Multiple Live Births           1      # Outcome Date GA Lbr Trung/2nd Weight Sex Delivery Anes PTL Lv   2 Current            1 Term 10/29/14 40w0d  3827 g (8 lb 7 oz) M Vag-Spont EPI N VLADIMIR      Obstetric Comments   Breech presentation   Pregnancy related leg pain, antepartum   Suspected problem with fetal growth not found       Baby complications/comments: None    Review of Systems   Constitutional: Negative  HENT: Negative  Eyes: Negative  Respiratory: Negative  Cardiovascular: Negative  Gastrointestinal: Negative  Endocrine: Negative  Genitourinary: Negative  Musculoskeletal: Negative  Allergic/Immunologic: Negative  Neurological: Negative            Historical Information   Past Medical History:   Diagnosis Date    Migraine     Normal delivery     2014 son    Sciatica     Thyroid disease     Thyroid dysfunction in pregnancy, antepartum     last assessed 14    Varicella      Past Surgical History:   Procedure Laterality Date    TOOTH EXTRACTION       Social History   History   Alcohol Use    Yes     Comment: Socially     History   Drug Use No     History   Smoking Status    Never Smoker   Smokeless Tobacco    Never Used     Family History: non-contributory    Meds/Allergies      Prescriptions Prior to Admission   Medication    ferrous sulfate 324 (65 Fe) mg    levothyroxine 150 mcg tablet    Prenatal MV-Min-Fe Fum-FA-DHA (PRENATAL+DHA PO)      No Known Allergies    OBJECTIVE:    Vitals: Last menstrual period 01/09/2018, not currently breastfeeding  There is no height or weight on file to calculate BMI  Physical Exam   Constitutional: She is oriented to person, place, and time  She appears well-developed and well-nourished  HENT:   Head: Normocephalic and atraumatic  Cardiovascular: Normal rate, regular rhythm, normal heart sounds and intact distal pulses  Exam reveals no gallop and no friction rub  No murmur heard  Pulmonary/Chest: Effort normal and breath sounds normal  No respiratory distress  She has no wheezes  She has no rales  Abdominal: Soft  Bowel sounds are normal  She exhibits no distension  There is no tenderness  There is no rebound and no guarding  Musculoskeletal: Normal range of motion  She exhibits no edema, tenderness or deformity  Neurological: She is alert and oriented to person, place, and time  Skin: Skin is warm and dry  Psychiatric: She has a normal mood and affect           Cervix:   3/70/-2    Fetal heart rate:    140/mod variability/accels, no decels    Azure:    every 18 minutes    EFW: 8 lbs    GBS: Negative    Prenatal Labs:   Blood Type:   Lab Results   Component Value Date/Time    ABO Grouping O 11/09/2018 09:03 AM    ABO Grouping O 10/28/2014 01:24 PM     , D (Rh type):   Lab Results   Component Value Date/Time    Rh Factor Positive 11/09/2018 09:03 AM    Rh Factor Positive 10/28/2014 01:24 PM     , Antibody Screen:   Lab Results   Component Value Date/Time    Antibody Screen Negative 10/28/2014 01:24 PM    , HCT/HGB:   Lab Results   Component Value Date/Time    Hematocrit 34 8 11/09/2018 09:03 AM    Hematocrit 40 2 09/05/2017 12:56 PM    Hemoglobin 11 7 11/09/2018 09:03 AM    Hemoglobin 13 6 2017 12:56 PM      , MCV:   Lab Results   Component Value Date/Time    MCV 91 2018 09:03 AM    MCV 88 2017 12:56 PM      , Platelets:   Lab Results   Component Value Date/Time    Platelets 680 7372 09:03 AM    Platelets 087  12:56 PM      , 1 hour Glucola:   Lab Results   Component Value Date/Time    GLUCOSE 1 HR 50 GM GLUC CHALLENGE-PREG PTS 82 2014 09:44 AM    Glucose 89 2018 03:39 PM   , 3 hour GTT: No results found for: TXMVQTZ4NE, Varicella: No results found for: VARICELLAIGG    , Rubella:   Lab Results   Component Value Date/Time    Rubella IgG Quant >175 0 2018 02:09 PM        , VDRL/RPR:   Lab Results   Component Value Date/Time    RPR SCREEN Non-Reactive (q 10/28/2014 01:24 PM    RPR Non-Reactive 2018 03:39 PM      , Urine Culture/Screen:   Lab Results   Component Value Date/Time    Urine Culture No Growth <1000 cfu/mL 10/10/2018 10:17 AM       , Urine Drug Screen: No results found for: AMPHETUR, BARBTUR, BDZUR, THCUR, COCAINEUR, METHADONEUR, OPIATEUR, PCPUR, MTHAMUR, ECSTASYUR, TRICYCLICSUR, Hep B:   Lab Results   Component Value Date/Time    Hepatitis B Surface Ag Non-reactive 2018 02:09 PM     , Hep C: No components found for: HEPCSAG, EXTHEPCSAG   , HIV:   Lab Results   Component Value Date/Time    HIV-1/HIV-2 Ab Non-Reactive 2018 02:09 PM     , Chlamydia: No results found for: EXTCHLAMYDIA  , Gonorrhea:   Lab Results   Component Value Date/Time    N gonorrhoeae, DNA Probe N  gonorrhoeae Amplified DNA Negative 2018 04:15 PM     , Group B Strep:    Lab Results   Component Value Date/Time    Strep Grp B PCR Negative for Beta Hemolytic Strep Grp B by PCR 10/16/2018 08:14 AM          Invasive Devices          No matching active lines, drains, or airways            Assessment/Plan     ASSESSMENT:  26yo  at 40w2d weeks gestation who is being admitted for elective induction      PLAN:   1) Admit   2) CBC, RPR, Blood Type   3) Start with 125 cc hr LR; low dose pitocin titration   4) GBS negative status: no PCN for prophylaxis    5) Analgesia and/or epidural at patient request   6) Anticipate    7) Discussed with Dr Kadi Novak      This patient will be an INPATIENT  and I certify the anticipated length of stay is >2 Midnights      Mari Hargrove MD  OBGYN PGY1  2018  8:17 AM

## 2018-11-10 PROCEDURE — 99024 POSTOP FOLLOW-UP VISIT: CPT | Performed by: OBSTETRICS & GYNECOLOGY

## 2018-11-10 RX ORDER — IBUPROFEN 600 MG/1
600 TABLET ORAL EVERY 6 HOURS PRN
Qty: 30 TABLET | Refills: 0 | Status: SHIPPED | OUTPATIENT
Start: 2018-11-10 | End: 2019-02-07

## 2018-11-10 RX ADMIN — DOCUSATE SODIUM 100 MG: 100 CAPSULE, LIQUID FILLED ORAL at 09:34

## 2018-11-10 RX ADMIN — DOCUSATE SODIUM 100 MG: 100 CAPSULE, LIQUID FILLED ORAL at 17:40

## 2018-11-10 RX ADMIN — IBUPROFEN 600 MG: 600 TABLET, FILM COATED ORAL at 04:44

## 2018-11-10 RX ADMIN — LEVOTHYROXINE SODIUM 150 MCG: 75 TABLET ORAL at 06:12

## 2018-11-10 RX ADMIN — IBUPROFEN 600 MG: 600 TABLET, FILM COATED ORAL at 14:36

## 2018-11-10 NOTE — PROGRESS NOTES
Progress Note - OB/GYN   Mitzi Gilbert 32 y o  female MRN: [id number]  Unit/Bed#: -01 Encounter: [id number]    Assessment:  Post partum Day #1 s/p , stable, baby in room    Plan:  1  Continue routine post partum care   Encourage ambulation   Encourage breastfeeding  2  PP Bleeding: s/p 8 hour Pitocin  3  Orthodoxy: no blood  4  Hypothyroid: 150mcg daily     Subjective:  Patient is doing well  She states that she feels sore and tender at her breast bone  She is not tender on palpation  She first experienced the pain when she stood up  Pain: yes, cramping, improved with meds  Tolerating PO: yes  Voiding: yes  Flatus: yes  BM: no   Ambulating: yes  Breastfeeding:  yes  Chest pain: no  Shortness of breath: no  Leg pain: no  Lochia: minimal    Objective:     Vitals: /60   Pulse 62   Temp 98 1 °F (36 7 °C) (Oral)   Resp 18   Ht 5' 4" (1 626 m)   Wt 89 8 kg (198 lb)   LMP 2018 (Exact Date)   SpO2 96%   Breastfeeding? Yes   BMI 33 99 kg/m²       Intake/Output Summary (Last 24 hours) at 11/10/18 0246  Last data filed at 11/10/18 0145   Gross per 24 hour   Intake          3554 25 ml   Output             2592 ml   Net           962 25 ml       Lab Results   Component Value Date    WBC 13 08 (H) 2018    HGB 11 7 2018    HCT 34 8 2018    MCV 91 2018     2018       Physical Exam:     Gen: AAOx3, NAD  CV: RRR  Lungs: CTA b/l  Abd: Soft, non-tender, non-distended, no rebound or guarding  Uterine fundus firm and non-tender, 1 cm below the umbilicus     Ext: Non tender    Tello Yates MD  11/10/2018  2:46 AM

## 2018-11-10 NOTE — PROGRESS NOTES
Called to bedside to assess patient for postpartum vaginal bleeding, on arrival bleeding noted on the edna pad, uterus firm 1cm below the umbilicus, Pitocin running, intrauterine examination performed and large blood clot removed from lower uterine segment, QBL 294cc postpartum  Total EBL from postpartum + delivery = 552  Vital stable, BP 110s/50s, HR 70s  Straight cathed for 300cc yellow urine  Bleeding improved  Continue to closely monitor  8 hour bag of Pitocin    Patient is a Adali Del Toro aware

## 2018-11-10 NOTE — DISCHARGE SUMMARY
Discharge Summary - OB/GYN   Colleen Graves 32 y o  female MRN: [id number]  Unit/Bed#: -01 Encounter: [id number]      Admission Date: 2018     Discharge Date: 2018    Admitting Diagnosis:   1  Pregnancy at 40w2d  2  Successful external cephalic version   3  Hypothyroidism  4  Sabianist, refusal of blood products    Discharge Diagnosis:   Same, delivered  Delivery of term     Procedures: spontaneous vaginal delivery    Attending: Candace Escamilla MD    Hospital Course:     Ms Colleen Graves is a 32 y o  Connie Serve at [address] [place] [address]  She presented to labor and delivery for elective induction of labor  On admission, her cervical exam was 3/70/-2  She was induced with pitocin at 9:10  At 15:07, she was 4/70/-2  An epidural was placed for pain management  Patient was 6/90/-1 at 17:13 and quickly became complete at 18:26  She delivered a viable male  on 2018 at 18:40  Weight 8lbs 8oz via spontaneous vaginal delivery  Apgars were 9 (1 min) and 9 (5 min)   was transferred to  nursery  Patient tolerated the procedure well and was transferred to recovery in stable condition  Her post-partum course was complicated by post-partum bleeding  She was treated with an eight hour bag of pitocin  Her post-partum pain was well controlled with oral analgesics  On day of discharge, she was ambulating and able to reasonably perform all ADLs  She was voiding and had appropriate bowel function  Pain was well controlled  She was discharged home on post-partum day #2 without complications  Patient was instructed to follow up with her OB as an outpatient and was given appropriate warnings to call provider if she develops signs of infection or uncontrolled pain  Complications: none apparent    Condition at discharge: stable     Discharge instructions/Information to patient and family:   See after visit summary for information provided to patient and family  Provisions for Follow-Up Care:  See after visit summary for information related to follow-up care and any pertinent home health orders  Disposition: Home    Planned Readmission: No    Discharge Medications: For a complete list of the patient's medications, please refer to her med rec      Collin Luque  OB/Gyn, PGY-1  11/11/2018  8:15 AM

## 2018-11-10 NOTE — L&D DELIVERY NOTE
Vaginal Delivery Summary - OB/GYN   Yassine Montiel 32 y o  female MRN: [id number]  Unit/Bed#: -01 Encounter: [id number]          Predelivery Diagnosis:  1  Pregnancy at 40w2d  2  Successful external cephalic version   3  Hypothyroidism  4  Catholic, refusal of blood products     Postdelivery Diagnosis:  1  Same as above  2  Delivery of term     Procedure: Spontaneous Vaginal Delivery    AttendinPedrito Du    Assistant: Maxi    Anesthesia: Epidural    QBL: pending   Admission H 7   Admission platelets: 381    Complications: none apparent    Specimens: cord blood, arterial and venous cord blood gasses, placenta to storage    Findings:   1  Viable male at 18:40, with APGARS of 9 and 9 at 1 and 5 minutes respectively,  2  Spontaneous delivery of intact placenta at 1845  3  Blood gases:   Arterial pH: 7 188   Arterial base excess: -8 5   Venous pH: 7 332   Venous base excess: -4 8    Disposition:  Patient tolerated the procedure well and was recovering in labor and delivery room     Brief history and labor course:  Ms Yassine Montiel is a 32 y o  [place] at 39wk3d  She presented to labor and delivery for elective induction of labor  On admission, her cervical exam was 3/70/-2  She was induced with pitocin at 9:10  At 15:07, she was 4/70/-2  An epidural was placed for pain management  Patient was 6/90/-1 at 17:13 and quickly became complete at 18:26  Description of procedure    After pushing for 8 minutes, at 18:40 patient delivered a viable male , wt pending, apgars of 9 (1 min) and 9 (5 min)  The fetal vertex delivered spontaneously  Baby was checked for nuchal  There was no nuchal  The anterior shoulder delivered atraumatically with maternal expulsive forces and the assistance of downward traction  The posterior shoulder delivered with maternal expulsive forces and the assistance of upward traction  The remainder of the fetus delivered spontaneously       Upon delivery, the infant was placed on the mothers abdomen and the cord was clamped and cut  Delayed cord clamping was performed  The infant was noted to cry spontaneously and was moving all extremities appropriately  There was no evidence for injury  Awaiting nurse resuscitators evaluated the   Arterial and venous cord blood gases and cord blood was collected for analysis  These were promptly sent to the lab  In the immediate post-partum, 30 units of IV pitocin was administered, and the uterus was noted to contract down well with massage and pitocin  The placenta delivered spontaneously at 18:45 and was noted to have a centrally inserted 3 vessel cord  The vagina, cervix, perineum, and rectum were inspected and was noted to be intact  At the conclusion of the procedure, all needle, sponge, and instrument counts were noted to be correct  Patient tolerated the procedure well and was allowed to recover in labor and delivery room with family and  before being transferred to the post-partum floor  Dr Darrick Angela was present and participated in all key portions of the case        Goyo Coley MD  2018  7:22 PM

## 2018-11-10 NOTE — LACTATION NOTE
This note was copied from a baby's chart  Mom states infant is feeding well so far  Feels she didn't have enough support with her last child but intends to feed this infant longer  Discussed expected  infant feeding patterns in the first few days, feeding on cue and how to call for additional assistance as needed  Given admission breastfeeding pkat and same reviewed  Encouraged to call for assistance as needed

## 2018-11-10 NOTE — PLAN OF CARE
Problem: BIRTH - VAGINAL/ SECTION  Goal: Fetal and maternal status remain reassuring during the birth process  INTERVENTIONS:  - Monitor vital signs  - Monitor fetal heart rate  - Monitor uterine activity  - Monitor labor progression (vaginal delivery)  - DVT prophylaxis  - Antibiotic prophylaxis   Outcome: Progressing    Goal: Emotionally satisfying birthing experience for mother/fetus  Interventions:  - Assess, plan, implement and evaluate the nursing care given to the patient in labor  - Advocate the philosophy that each childbirth experience is a unique experience and support the family's chosen level of involvement and control during the labor process   - Actively participate in both the patient's and family's teaching of the birth process  - Consider cultural, Christianity and age-specific factors and plan care for the patient in labor   Outcome: Progressing      Problem: PAIN - ADULT  Goal: Verbalizes/displays adequate comfort level or baseline comfort level  Interventions:  - Encourage patient to monitor pain and request assistance  - Assess pain using appropriate pain scale  - Administer analgesics based on type and severity of pain and evaluate response  - Implement non-pharmacological measures as appropriate and evaluate response  - Consider cultural and social influences on pain and pain management  - Notify physician/advanced practitioner if interventions unsuccessful or patient reports new pain   Outcome: Progressing      Problem: INFECTION - ADULT  Goal: Absence or prevention of progression during hospitalization  INTERVENTIONS:  - Assess and monitor for signs and symptoms of infection  - Monitor lab/diagnostic results  - Monitor all insertion sites, i e  indwelling lines, tubes, and drains  - Monitor endotracheal (as able) and nasal secretions for changes in amount and color  - [place] appropriate cooling/warming therapies per order  - Administer medications as ordered  - Instruct and encourage patient and family to use good hand hygiene technique  - Identify and instruct in appropriate isolation precautions for identified infection/condition   Outcome: Progressing    Goal: Absence of fever/infection during neutropenic period  INTERVENTIONS:  - Monitor WBC  - Implement neutropenic guidelines   Outcome: Progressing      Problem: SAFETY ADULT  Goal: Patient will remain free of falls  INTERVENTIONS:  - Assess patient frequently for physical needs  -  Identify cognitive and physical deficits and behaviors that affect risk of falls    -  [place] fall precautions as indicated by assessment   - Educate patient/family on patient safety including physical limitations  - Instruct patient to call for assistance with activity based on assessment  - Modify environment to reduce risk of injury  - Consider OT/PT consult to assist with strengthening/mobility   Outcome: Progressing    Goal: Maintain or return to baseline ADL function  INTERVENTIONS:  -  Assess patient's ability to carry out ADLs; assess patient's baseline for ADL function and identify physical deficits which impact ability to perform ADLs (bathing, care of mouth/teeth, toileting, grooming, dressing, etc )  - Assess/evaluate cause of self-care deficits   - Assess range of motion  - Assess patient's mobility; develop plan if impaired  - Assess patient's need for assistive devices and provide as appropriate  - Encourage maximum independence but intervene and supervise when necessary  ¯ Involve family in performance of ADLs  ¯ Assess for home care needs following discharge   ¯ Request OT consult to assist with ADL evaluation and planning for discharge  ¯ Provide patient education as appropriate   Outcome: Progressing    Goal: Maintain or return mobility status to optimal level  INTERVENTIONS:  - Assess patient's baseline mobility status (ambulation, transfers, stairs, etc )    - Identify cognitive and physical deficits and behaviors that affect mobility  - Identify mobility aids required to assist with transfers and/or ambulation (gait belt, sit-to-stand, lift, walker, cane, etc )  - [place] fall precautions as indicated by assessment  - Record patient progress and toleration of activity level on Mobility SBAR; progress patient to next Phase/Stage  - Instruct patient to call for assistance with activity based on assessment  - Request Rehabilitation consult to assist with strengthening/weightbearing, etc    Outcome: Progressing      Problem: Knowledge Deficit  Goal: Patient/family/caregiver demonstrates understanding of disease process, treatment plan, medications, and discharge instructions  Complete learning assessment and assess knowledge base  Interventions:  - Provide teaching at level of understanding  - Provide teaching via preferred learning methods   Outcome: Progressing      Problem: DISCHARGE PLANNING  Goal: Discharge to home or other facility with appropriate resources  INTERVENTIONS:  - Identify barriers to discharge w/patient and caregiver  - Arrange for needed discharge resources and transportation as appropriate  - Identify discharge learning needs (meds, wound care, etc )  - Arrange for interpretive services to assist at discharge as needed  - Refer to Case Management Department for coordinating discharge planning if the patient needs post-hospital services based on physician/advanced practitioner order or complex needs related to functional status, cognitive ability, or social support system   Outcome: Progressing      Problem: Labor & Delivery  Goal: Manages discomfort  Assess and monitor for signs and symptoms of discomfort  Assess patient's pain level regularly and per hospital policy  Administer medications as ordered  Support use of nonpharmacological methods to help control pain such as distraction, imagery, relaxation, and application of heat and cold    Collaborate with interdisciplinary team and patient to determine appropriate pain management plan  1  Include patient in decisions related to comfort  2  Offer non-pharmacological pain management interventions  3  Report ineffective pain management to physician  Outcome: Progressing    Goal: Patient vital signs are stable  1  Assess vital signs - vaginal delivery     Outcome: Progressing

## 2018-11-11 VITALS
OXYGEN SATURATION: 98 % | DIASTOLIC BLOOD PRESSURE: 57 MMHG | TEMPERATURE: 98.5 F | WEIGHT: 198 LBS | BODY MASS INDEX: 33.8 KG/M2 | HEART RATE: 80 BPM | HEIGHT: 64 IN | RESPIRATION RATE: 18 BRPM | SYSTOLIC BLOOD PRESSURE: 103 MMHG

## 2018-11-11 PROCEDURE — 99024 POSTOP FOLLOW-UP VISIT: CPT | Performed by: OBSTETRICS & GYNECOLOGY

## 2018-11-11 RX ADMIN — LEVOTHYROXINE SODIUM 150 MCG: 75 TABLET ORAL at 05:38

## 2018-11-11 RX ADMIN — IBUPROFEN 600 MG: 600 TABLET, FILM COATED ORAL at 09:59

## 2018-11-11 RX ADMIN — DOCUSATE SODIUM 100 MG: 100 CAPSULE, LIQUID FILLED ORAL at 09:59

## 2018-11-11 NOTE — LACTATION NOTE
This note was copied from a baby's chart  Observed infant at breast in cradle hold  Good latch noted  Suggested minor re-positioning for a closer latch  Reviewed expected changes in infant feeding patterns, engorgement relief measures, signs of milk transfer, use of feeding log and when and where to call for additional assistance as needed  Given discharge breastfeeding pkat and same reviewed  Given Lanolin Cream, shells and gel pads for C/O nipple sore ness, with instructions

## 2018-11-11 NOTE — PLAN OF CARE
BIRTH - VAGINAL/ SECTION     Fetal and maternal status remain reassuring during the birth process Completed     Emotionally satisfying birthing experience for mother/fetus Completed        DISCHARGE PLANNING     Discharge to home or other facility with appropriate resources Completed        INFECTION - ADULT     Absence or prevention of progression during hospitalization Completed     Absence of fever/infection during neutropenic period Completed        Knowledge Deficit     Patient/family/caregiver demonstrates understanding of disease process, treatment plan, medications, and discharge instructions Completed        Labor & Delivery     Manages discomfort Completed     Patient vital signs are stable Completed        PAIN - ADULT     Verbalizes/displays adequate comfort level or baseline comfort level Completed        SAFETY ADULT     Patient will remain free of falls Completed     Maintain or return to baseline ADL function Completed     Maintain or return mobility status to optimal level Completed

## 2018-11-11 NOTE — PROGRESS NOTES
Progress Note - OB/GYN   [facility] 32 y o  female MRN: [id number]  Unit/Bed#: -01 Encounter: [id number]    Assessment:  Post partum Day #2 s/p , stable, baby in room    Plan:  1  Continue routine post partum care   Encourage ambulation   Encourage breastfeeding  2  PP Bleeding: s/p 8 hour Pitocin  3  Rastafarian: no blood  4  Hypothyroid: 150mcg daily     Subjective:  Patient is doing well  She is in good spirits and has no complaints  Pain: yes, cramping, improved with meds  Tolerating PO: yes  Voiding: yes  Flatus: yes  BM: no   Ambulating: yes  Breastfeeding:  yes  Chest pain: no  Shortness of breath: no  Leg pain: no  Lochia: minimal    Objective:     Vitals: BP 99/54 (BP Location: Left arm)   Pulse 80   Temp 97 9 °F (36 6 °C) (Oral)   Resp 20   Ht 5' 4" (1 626 m)   Wt 89 8 kg (198 lb)   LMP 2018 (Exact Date)   SpO2 98%   Breastfeeding? Yes   BMI 33 99 kg/m²       Intake/Output Summary (Last 24 hours) at 18 9629  Last data filed at 11/10/18 0819   Gross per 24 hour   Intake                0 ml   Output             1000 ml   Net            -1000 ml       Lab Results   Component Value Date    WBC 13 08 (H) 2018    HGB 11 7 2018    HCT 34 8 2018    MCV 91 2018     2018       Physical Exam:     Gen: AAOx3, NAD  CV: RRR  Lungs: CTA b/l  Abd: Soft, non-tender, non-distended, no rebound or guarding  Uterine fundus firm and non-tender, 1 cm below the umbilicus     Ext: Non tender    Andrew Sneed MD  2018  7:12 AM

## 2018-11-11 NOTE — DISCHARGE INSTRUCTIONS
Postpartum Bleeding   WHAT YOU NEED TO KNOW:   What is postpartum bleeding? Postpartum bleeding is vaginal bleeding after childbirth  This bleeding is normal, whether your baby was born vaginally or by   It contains blood and the tissue that lined the inside of your uterus when you were pregnant  What should I expect with postpartum bleeding? Postpartum bleeding usually lasts at least 10 days, and may last longer than 6 weeks  Your bleeding may range from light (barely staining a pad) to heavy (soaking a pad in 1 hour)  Usually, you have heavier bleeding right after childbirth, which slows over the next few weeks until it stops  The bleeding is red or dark brown with clots for the first 1 to 3 days  It then turns pink for several days, and then becomes a white or yellow discharge until it ends  When should I contact my healthcare provider? · Your bleeding increases, or you have heavy bleeding that soaks a pad in 1 hour for 2 hours in a row  · You pass large blood clots  · You are breathing faster than normal, or your heart is beating faster than normal     · You are urinating less than usual, or not at all  · You feel dizzy  · You have questions or concerns about your condition or care  When should I seek immediate care or call 911? · You are suddenly short of breath and feel lightheaded  · You have sudden chest pain  CARE AGREEMENT:   You have the right to help plan your care  Learn about your health condition and how it may be treated  Discuss treatment options with your caregivers to decide what care you want to receive  You always have the right to refuse treatment  The above information is an  only  It is not intended as medical advice for individual conditions or treatments  Talk to your doctor, nurse or pharmacist before following any medical regimen to see if it is safe and effective for you    © 2017 [employer] Information is for End User's use only and may not be sold, redistributed or otherwise used for commercial purposes  All illustrations and images included in CareNotes® are the copyrighted property of A D A M , Inc  or Alcides Oliva

## 2018-11-12 ENCOUNTER — TRANSITIONAL CARE MANAGEMENT (OUTPATIENT)
Dept: INTERNAL MEDICINE CLINIC | Facility: CLINIC | Age: 28
End: 2018-11-12

## 2018-11-12 LAB — RPR SER QL: NORMAL

## 2018-11-12 NOTE — UTILIZATION REVIEW
Notification of Maternity/Delivery & [place] Birth Information for Admission  Notification of Maternity/Delivery &  Birth Information for Admission to our facility [address] [place] [address]  Be advised that this patient was admitted to our facility under Inpatient Status  Please contact the Utilization Review Department where the patient is receiving care services for additional admission information  Facility: [address] [place] [address])  Address: [address] [place] [address]  Phone: [phone] Tax ID: [id number]  NPI: [id number]  Medicare ID: [id number]  Place of Service Code: 24   Place of Service Name: Inpatient Hospital  Presentation Date & Time: 2018  7:15 AM  Inpatient Admission Date & Time: 18 0715  Discharge Date & Time: 2018 12:29 PM   Discharge Disposition (if discharged): Home/Self Care  Attending Physician & NPI: Nikita Solitario Md [[id number]]  [facility]    Specialty- Obstetrics and Gynecology  [facility] ID- [id number]  [address]  Phone 1: [phone]  Fax: [phone]  Mother of  Information: Mark Gonzalez   MRN: [id number] YOB: 1990   Mother's Admitting Diagnosis: Encounter for full-term uncomplicated delivery [W39]  Estimated Date of Delivery: 18  Type of Delivery: Vaginal, Spontaneous Delivery    Delivering clinician: Nikita Solitario   OB History      Para Term  AB Living    2 2 2     2    SAB TAB Ectopic Multiple Live Births          0 2        Obstetric Comments    Breech presentation  Pregnancy related leg pain, antepartum  Suspected problem with fetal growth not found          [place] Name & MRN:   Information for the patient's :  Omero Espinoza [[id number]]      Delivery Information:  Sex: male  Delivered 2018 6:40 PM by Vaginal, Spontaneous Delivery; Gestational Age: 41w4d     Measurements:  Weight: 8 lb 8 oz (3856 g); Height: 20"    APGAR 1 minute 5 minutes 10 minutes   Totals: [facility] Utilization Review Department  Phone: [phone]; Fax [phone]  ATTENTION: Please call with any questions or concerns to [phone]  and carefully listen to the prompts so that you are directed to the right person  Send all requests for admission clinical reviews, approved or denied determinations and any other requests to fax [phone]   All voicemails are confidential

## 2018-11-26 LAB — PLACENTA IN STORAGE: NORMAL

## 2018-12-03 ENCOUNTER — POSTPARTUM VISIT (OUTPATIENT)
Dept: OBGYN CLINIC | Age: 28
End: 2018-12-03

## 2018-12-03 VITALS — WEIGHT: 188.25 LBS | SYSTOLIC BLOOD PRESSURE: 92 MMHG | DIASTOLIC BLOOD PRESSURE: 80 MMHG | BODY MASS INDEX: 32.31 KG/M2

## 2018-12-03 DIAGNOSIS — E03.9 ACQUIRED HYPOTHYROIDISM: ICD-10-CM

## 2018-12-03 PROBLEM — Z34.83 ENCOUNTER FOR SUPERVISION OF OTHER NORMAL PREGNANCY, THIRD TRIMESTER: Status: RESOLVED | Noted: 2018-06-19 | Resolved: 2018-12-03

## 2018-12-03 PROBLEM — R55 VASOVAGAL ATTACK: Status: RESOLVED | Noted: 2018-10-10 | Resolved: 2018-12-03

## 2018-12-03 PROBLEM — O99.280 THYROID DYSFUNCTION IN PREGNANCY, ANTEPARTUM: Status: RESOLVED | Noted: 2018-06-19 | Resolved: 2018-12-03

## 2018-12-03 PROBLEM — Z3A.40 40 WEEKS GESTATION OF PREGNANCY: Status: RESOLVED | Noted: 2018-11-09 | Resolved: 2018-12-03

## 2018-12-03 PROBLEM — E07.9 THYROID DYSFUNCTION IN PREGNANCY, ANTEPARTUM: Status: RESOLVED | Noted: 2018-06-19 | Resolved: 2018-12-03

## 2018-12-03 PROCEDURE — 99024 POSTOP FOLLOW-UP VISIT: CPT | Performed by: OBSTETRICS & GYNECOLOGY

## 2018-12-03 NOTE — ASSESSMENT & PLAN NOTE
Healing well  Contraception: planning on vasectomy, has appointment scheduled  Mood stable      Exercising 2x per week  Follow up annual in 6 months

## 2018-12-03 NOTE — PATIENT INSTRUCTIONS
Postpartum Depression   WHAT YOU NEED TO KNOW:   What is postpartum depression? Postpartum depression is a mood disorder that occurs after giving birth  A mood is an emotion or a feeling  Moods affect your behavior and how you feel about yourself and life in general  Depression is a sad mood that you cannot control  Women often feel sad, afraid, or nervous after their baby is born  These feelings are called postpartum blues or baby blues, and they usually go away in 1 to 2 weeks  With postpartum depression, these symptoms get worse and continue for more than 2 weeks  Postpartum depression is a serious condition that affects your daily activities and relationships  What causes postpartum depression? Healthcare providers do not know exactly what causes postpartum depression  It may be caused by a sudden drop in hormone levels after childbirth  A previous episode of postpartum depression or a family history of depression may increase your risk  Several things may trigger postpartum depression:  · Lack of support from the baby's father or other family members    · Feeling more tired than usual    · Stress, a poor diet, or lack of sleep    · Pain after childbirth or pain during breastfeeding    · Sudden change in lifestyle  How is postpartum depression diagnosed? Postpartum depression affects your daily activities and your relationships with other people  Healthcare providers will ask you questions about your signs and symptoms and how they are affecting your life  The symptoms of postpartum depression usually begin within 1 month after childbirth  You feel depressed or lose interest in activities you enjoy nearly every day for at least 2 weeks  You also have 4 or more of the following symptoms:  · You feel tired or have less energy than usual      · You feel unimportant or guilty most of the time  · You think about hurting or killing yourself  · Your appetite changes   You may lose your appetite and lose weight without trying  Your appetite may also increase and you may gain weight  · You are restless, irritable, or withdrawn  · You have trouble concentrating and remembering things  You have trouble doing daily tasks or making decisions  · You have trouble sleeping, even after the baby is asleep  How is postpartum depression treated? · Psychotherapy:  During therapy, you will talk with healthcare providers about how to cope with your feelings and moods  This can be done alone or in a group  It may also be done with family members or your partner  · Antidepressants: This medicine is given to decrease or stop the symptoms of depression  You usually need to take antidepressants for several weeks before you begin to feel better  Do not stop taking antidepressants unless your healthcare provider tells you to  Healthcare providers may try a different antidepressant if one type does not work  What can I do to feel better? · Rest:  Do not try to do everything all at the same time  Do only what is needed and let other things wait until later  Ask your family or friends for help, especially if you have other children  Ask your partner to help with night feedings or other baby care  Try to sleep when the baby naps  · Get emotional support:  Share your feelings with your partner, a friend, or another mother  · Take care of yourself:  Shower and dress each day  Do not skip meals  Try to get out of the house a little each day  Get regular exercise  Eat a healthy diet  Avoid alcohol because it can make your depression worse  Do not isolate yourself  Go for a walk or meet with a friend  It is also important that you have some time by yourself each day  How do I find support and more information?    · [address] [place] [address], Public Information & Communication Branch  [address] [place] [address] W, [address] [facility], [address]  [place] [id number]   Phone: [phone]  Phone: [phone]  Web Address: [place] paz  When should I contact my healthcare provider? · You cannot make it to your next visit  · Your depression does not get better with treatment or it gets worse  · You have questions or concerns about your condition or care  When should I seek immediate care or call 911? · You think about hurting or killing yourself, your baby, or someone else  · You feel like other people want to hurt you  · You hear voices telling you to hurt yourself or your baby  CARE AGREEMENT:   You have the right to help plan your care  Learn about your health condition and how it may be treated  Discuss treatment options with your caregivers to decide what care you want to receive  You always have the right to refuse treatment  The above information is an  only  It is not intended as medical advice for individual conditions or treatments  Talk to your doctor, nurse or pharmacist before following any medical regimen to see if it is safe and effective for you  © 2017 [address] [employer] Information is for End User's use only and may not be sold, redistributed or otherwise used for commercial purposes  All illustrations and images included in CareNotes® are the copyrighted property of A D A M , Inc  or Alcides Oliva

## 2018-12-03 NOTE — PROGRESS NOTES
Assessment/Plan:    Encounter for postpartum visit  Healing well  Contraception: planning on vasectomy, has appointment scheduled  Mood stable  Exercising 2x per week  Follow up annual in 6 months       Diagnoses and all orders for this visit:    Encounter for postpartum visit    Spontaneous vaginal delivery    Acquired hypothyroidism  -     TSH, 3rd generation with Free T4 reflex; Future          Subjective:      Patient ID: Lynette Acuna is a 32 y o  female  Postpartum Visit  Patient is here for a postpartum visit  She is 3 weeks postpartum following a spontaneous vaginal delivery  I have fully reviewed the prenatal and intrapartum course  The delivery was at 40 gestational weeks  Outcome: spontaneous vaginal delivery  Anesthesia: epidural   Postpartum course has been good  Baby's course has been good  Baby is feeding by bottle - nutramigen  Bleeding staining only and brown  Bowel function is normal  Bladder function is normal  Patient is not sexually active  Contraception method is none  Postpartum depression screening: negative                       Gynecologic Exam   The patient's pertinent negatives include no genital itching, genital lesions, genital odor, genital rash, pelvic pain, vaginal bleeding or vaginal discharge  The patient is experiencing no pain  Pertinent negatives include no chills, constipation, diarrhea, fever, nausea, sore throat or vomiting  The following portions of the patient's history were reviewed and updated as appropriate:   She  has a past medical history of Migraine; Normal delivery; Sciatica; Thyroid disease; Thyroid dysfunction in pregnancy, antepartum; and Varicella  She   Patient Active Problem List    Diagnosis Date Noted    Encounter for postpartum visit 12/03/2018    Refusal of blood transfusions as patient is [employer] 04/13/2018    Hypothyroidism 03/10/2014     She  has a past surgical history that includes Tooth extraction    Her family history includes Anemia in her family; Arthritis in her family and sister; Diabetes in her family; Heart disease in her family; Hypertension in her family and mother; Kidney disease in her family and maternal uncle; No Known Problems in her brother, father, paternal grandmother, and son  She  reports that she has never smoked  She has never used smokeless tobacco  She reports that she drinks alcohol  She reports that she does not use drugs  Current Outpatient Prescriptions   Medication Sig Dispense Refill    Prenatal MV-Min-Fe Fum-FA-DHA (PRENATAL+DHA PO) Take by mouth      ferrous sulfate 324 (65 Fe) mg Take 1 tablet (324 mg total) by mouth daily for 90 days (Patient not taking: Reported on 12/3/2018 ) 90 tablet 3    ibuprofen (MOTRIN) 600 mg tablet Take 1 tablet (600 mg total) by mouth every 6 (six) hours as needed for mild pain (Patient not taking: Reported on 12/3/2018 ) 30 tablet 0    levothyroxine 150 mcg tablet Take 1 tablet (150 mcg total) by mouth daily for 30 days 90 tablet 0     No current facility-administered medications for this visit  Current Outpatient Prescriptions on File Prior to Visit   Medication Sig    Prenatal MV-Min-Fe Fum-FA-DHA (PRENATAL+DHA PO) Take by mouth    ferrous sulfate 324 (65 Fe) mg Take 1 tablet (324 mg total) by mouth daily for 90 days (Patient not taking: Reported on 12/3/2018 )    ibuprofen (MOTRIN) 600 mg tablet Take 1 tablet (600 mg total) by mouth every 6 (six) hours as needed for mild pain (Patient not taking: Reported on 12/3/2018 )    levothyroxine 150 mcg tablet Take 1 tablet (150 mcg total) by mouth daily for 30 days     No current facility-administered medications on file prior to visit  She has No Known Allergies       Review of Systems   Constitutional: Negative for activity change, appetite change, chills, fatigue and fever  HENT: Negative for rhinorrhea, sneezing and sore throat  Eyes: Negative for visual disturbance     Respiratory: Negative for cough, shortness of breath and wheezing  Cardiovascular: Negative for chest pain, palpitations and leg swelling  Gastrointestinal: Negative for abdominal distention, constipation, diarrhea, nausea and vomiting  Genitourinary: Negative for difficulty urinating, pelvic pain and vaginal discharge  Neurological: Negative for syncope and light-headedness  Objective:      BP 92/80 (BP Location: Right arm, Patient Position: Sitting, Cuff Size: Standard)   Wt 85 4 kg (188 lb 4 oz)   LMP 01/09/2018 (Exact Date)   Breastfeeding? No   BMI 32 31 kg/m²          Physical Exam   Genitourinary: Vagina normal  There is no rash, tenderness or lesion on the right labia  There is no rash, tenderness or lesion on the left labia  Uterus is not deviated, not enlarged, not fixed and not tender  Cervix exhibits no motion tenderness, no discharge and no friability  Right adnexum displays no mass, no tenderness and no fullness  Left adnexum displays no mass, no tenderness and no fullness  No erythema or tenderness in the vagina  No vaginal discharge found

## 2018-12-03 NOTE — PROGRESS NOTES
Postpartum Visit  Patient is here for a postpartum visit  She is 3 weeks postpartum following a spontaneous vaginal delivery  I have fully reviewed the prenatal and intrapartum course  The delivery was at 40 gestational weeks  Outcome: spontaneous vaginal delivery  Anesthesia: epidural   Postpartum course has been good  Baby's course has been good  Baby is feeding by bottle - nutramigen  Bleeding staining only and brown  Bowel function is normal  Bladder function is normal  Patient is not sexually active  Contraception method is none  Postpartum depression screening: {neg default:58402::"negative"}

## 2019-02-07 ENCOUNTER — OFFICE VISIT (OUTPATIENT)
Dept: INTERNAL MEDICINE CLINIC | Facility: CLINIC | Age: 29
End: 2019-02-07
Payer: COMMERCIAL

## 2019-02-07 VITALS
DIASTOLIC BLOOD PRESSURE: 78 MMHG | HEART RATE: 72 BPM | BODY MASS INDEX: 31.38 KG/M2 | SYSTOLIC BLOOD PRESSURE: 98 MMHG | WEIGHT: 182.8 LBS | OXYGEN SATURATION: 98 %

## 2019-02-07 DIAGNOSIS — Z00.00 ENCOUNTER FOR WELLNESS EXAMINATION IN ADULT: Primary | ICD-10-CM

## 2019-02-07 PROCEDURE — 99214 OFFICE O/P EST MOD 30 MIN: CPT | Performed by: INTERNAL MEDICINE

## 2019-02-07 NOTE — PROGRESS NOTES
HS ANNUAL PHYSICAL  [facility]    NAME: Marylou Delgado  AGE: 29 y o  SEX: female  : 1990     DATE: 2019    Assessment and Plan     Problem List Items Addressed This Visit     None      Visit Diagnoses     Encounter for wellness examination in adult    -  Primary            · Patient Counseling:   · Nutrition: Stressed importance of a well balanced diet, moderation of sodium/saturated fat, caloric balance and sufficient intake of fiber  · Exercise: Stressed the importance of regular exercise with a goal of 150 minutes per week  · Dental Health: Discussed daily flossing and brushing and regular dental visits   · Sexuality: Discussed sexually transmitted infections, use of condoms and prevention of unintended pregnancy  · Alcohol Use:  Recommended moderation of alcohol intake  · Injury Prevention: Discussed Safety Belts, Safety Helmets, and Smoke Detectors    · Immunizations reviewed  Yes, uptodate  · Discussed benefits of screening- uptodate  · Discussed the patient's BMI with her  The BMI is above average; BMI management plan is completed     No Follow-up on file  Chief Complaint     Chief Complaint   Patient presents with    Physical Exam       History of Present Illness     HPI    Well Adult Physical   Patient here for a comprehensive physical exam       Diet and Physical Activity  Diet: low fat diet, low carbohydrate diet and low calorie diet  Weight concerns: Patient has class 1 obesity (BMI 30-34  9)  Exercise: daily      Depression Screen  PHQ-9 Depression Screening    PHQ-9:    Frequency of the following problems over the past two weeks:       Little interest or pleasure in doing things:  0 - not at all  Feeling down, depressed, or hopeless:  0 - not at all  PHQ-2 Score:  0          General Health  Hearing: Normal:  bilateral  Vision: no vision problems  Dental: no dental visits for >1 year      Reproductive Health- good    The following portions of the patient's history were reviewed and updated as appropriate: allergies, current medications, past family history, past medical history, past social history, past surgical history and problem list     Review of Systems     Review of Systems   Constitutional: Negative for chills, diaphoresis, fatigue and fever  HENT: Negative for congestion, ear discharge, ear pain, hearing loss, postnasal drip, rhinorrhea, sinus pain, sinus pressure, sneezing, sore throat and voice change  Eyes: Negative for pain, discharge, redness and visual disturbance  Respiratory: Negative for cough, chest tightness, shortness of breath and wheezing  Cardiovascular: Negative for chest pain, palpitations and leg swelling  Gastrointestinal: Negative for abdominal distention, abdominal pain, blood in stool, constipation, diarrhea, nausea and vomiting  Endocrine: Negative for cold intolerance, heat intolerance, polydipsia, polyphagia and polyuria  Genitourinary: Negative for dysuria, flank pain, frequency, hematuria and urgency  Musculoskeletal: Negative for arthralgias, back pain, gait problem, joint swelling, myalgias, neck pain and neck stiffness  Skin: Negative for rash  Neurological: Negative for dizziness, tremors, syncope, facial asymmetry, speech difficulty, weakness, light-headedness, numbness and headaches  Hematological: Does not bruise/bleed easily  Psychiatric/Behavioral: Negative for behavioral problems, confusion and sleep disturbance  The patient is not nervous/anxious          Past Medical History     Past Medical History:   Diagnosis Date    Migraine     Normal delivery     2014 son    Sciatica     Thyroid disease     Thyroid dysfunction in pregnancy, antepartum     last assessed 09/02/14    Varicella        Past Surgical History     Past Surgical History:   Procedure Laterality Date    TOOTH EXTRACTION         Social History     Social History     Social History    Marital status: /Civil Union     Spouse name: N/A    Number of children: N/A    Years of education: N/A     Social History Main Topics    Smoking status: Never Smoker    Smokeless tobacco: Never Used    Alcohol use Yes      Comment: Socially    Drug use: No    Sexual activity: Yes     Partners: Male      Comment:      Other Topics Concern    None     Social History Narrative    Caffeine use    Exercising erratically    Uses safety equipment seatbelts       Family History     Family History   Problem Relation Age of Onset    Hypertension Mother     Anemia Family     Arthritis Family     Diabetes Family     Hypertension Family     Kidney disease Family     Heart disease Family     No Known Problems Father     Arthritis Sister     No Known Problems Brother     No Known Problems Son     No Known Problems Paternal Grandmother     Kidney disease Maternal Uncle        Current Medications       Current Outpatient Prescriptions:     levothyroxine 150 mcg tablet, Take 1 tablet (150 mcg total) by mouth daily for 30 days, Disp: 90 tablet, Rfl: 0     Allergies     No Known Allergies    Objective     BP 98/78 (BP Location: Left arm, Patient Position: Sitting)   Pulse 72   Wt 82 9 kg (182 lb 12 8 oz)   SpO2 98%   BMI 31 38 kg/m²      Physical Exam   Constitutional: She is oriented to person, place, and time  She appears well-developed and well-nourished  No distress  HENT:   Head: Normocephalic and atraumatic  Right Ear: External ear normal    Left Ear: External ear normal    Nose: Nose normal    Mouth/Throat: Oropharynx is clear and moist    Eyes: Conjunctivae and EOM are normal  Right eye exhibits no discharge  Left eye exhibits no discharge  No scleral icterus  Neck: Normal range of motion  Neck supple  No JVD present  No tracheal deviation present  No thyromegaly present  Cardiovascular: Normal rate, regular rhythm, normal heart sounds and intact distal pulses    Exam reveals no gallop and no friction rub  No murmur heard  Pulmonary/Chest: Effort normal and breath sounds normal  No respiratory distress  She has no wheezes  She has no rales  She exhibits no tenderness  Abdominal: Soft  Bowel sounds are normal  She exhibits no distension  There is no tenderness  There is no rebound and no guarding  Musculoskeletal: Normal range of motion  She exhibits no edema or tenderness  Lymphadenopathy:     She has no cervical adenopathy  Neurological: She is alert and oriented to person, place, and time  No cranial nerve deficit  She exhibits normal muscle tone  Coordination normal    Skin: Skin is warm and dry  No rash noted  She is not diaphoretic  No erythema  Psychiatric: She has a normal mood and affect   Judgment normal          No exam data present    Health Maintenance     Health Maintenance   Topic Date Due    Depression Screening PHQ  1990    PAP SMEAR  01/04/2021    DTaP,Tdap,and Td Vaccines (4 - Td) 09/11/2028    INFLUENZA VACCINE  Completed     Immunization History   Administered Date(s) Administered    Influenza Quadrivalent, 6-35 Months IM 09/30/2014    Influenza, injectable, quadrivalent, preservative free 0 5 mL 10/08/2018    Tdap 09/30/2014, 01/05/2017, 09/11/2018       Christine Cuellar MD  [facility]

## 2019-03-25 DIAGNOSIS — E03.9 HYPOTHYROIDISM, UNSPECIFIED TYPE: ICD-10-CM

## 2019-03-25 RX ORDER — LEVOTHYROXINE SODIUM 0.15 MG/1
150 TABLET ORAL DAILY
Qty: 90 TABLET | Refills: 2 | Status: SHIPPED | OUTPATIENT
Start: 2019-03-25 | End: 2019-10-22 | Stop reason: SDUPTHER

## 2019-05-01 DIAGNOSIS — E03.9 HYPOTHYROIDISM, UNSPECIFIED TYPE: Primary | ICD-10-CM

## 2019-05-01 RX ORDER — LEVOTHYROXINE SODIUM 175 UG/1
TABLET ORAL
Qty: 90 TABLET | Refills: 1 | Status: SHIPPED | OUTPATIENT
Start: 2019-05-01 | End: 2019-10-22

## 2019-10-04 ENCOUNTER — TELEPHONE (OUTPATIENT)
Dept: INTERNAL MEDICINE CLINIC | Facility: CLINIC | Age: 29
End: 2019-10-04

## 2019-10-15 ENCOUNTER — TELEPHONE (OUTPATIENT)
Dept: INTERNAL MEDICINE CLINIC | Facility: CLINIC | Age: 29
End: 2019-10-15

## 2019-10-15 NOTE — TELEPHONE ENCOUNTER
Pt will be faxing over her results for her thyroid blood work  She had to go through a third party lab to have done as she does not have insurance at the moment  Feels like her levothyroxine 175mg is not working  She still have extreme fatigue and hair loss  Also has been trying to lose weight for over a year but nothing seems to be helping   Last seen 2/7/19    [phone]

## 2019-10-22 DIAGNOSIS — E03.9 HYPOTHYROIDISM, UNSPECIFIED TYPE: Primary | ICD-10-CM

## 2019-10-22 DIAGNOSIS — E03.9 HYPOTHYROIDISM, UNSPECIFIED TYPE: ICD-10-CM

## 2019-10-22 RX ORDER — LEVOTHYROXINE SODIUM 0.15 MG/1
150 TABLET ORAL DAILY
Qty: 90 TABLET | Refills: 2 | Status: SHIPPED | OUTPATIENT
Start: 2019-10-22 | End: 2021-03-26

## 2020-06-03 ENCOUNTER — TELEPHONE (OUTPATIENT)
Dept: INTERNAL MEDICINE CLINIC | Facility: CLINIC | Age: 30
End: 2020-06-03

## 2020-06-04 ENCOUNTER — OFFICE VISIT (OUTPATIENT)
Dept: INTERNAL MEDICINE CLINIC | Facility: CLINIC | Age: 30
End: 2020-06-04
Payer: COMMERCIAL

## 2020-06-04 VITALS
SYSTOLIC BLOOD PRESSURE: 114 MMHG | OXYGEN SATURATION: 98 % | BODY MASS INDEX: 31.86 KG/M2 | HEART RATE: 86 BPM | DIASTOLIC BLOOD PRESSURE: 72 MMHG | HEIGHT: 64 IN | WEIGHT: 186.6 LBS | TEMPERATURE: 98.2 F

## 2020-06-04 DIAGNOSIS — E03.9 ACQUIRED HYPOTHYROIDISM: Primary | ICD-10-CM

## 2020-06-04 DIAGNOSIS — E66.9 OBESITY (BMI 30-39.9): ICD-10-CM

## 2020-06-04 PROCEDURE — 1036F TOBACCO NON-USER: CPT | Performed by: INTERNAL MEDICINE

## 2020-06-04 PROCEDURE — 3008F BODY MASS INDEX DOCD: CPT | Performed by: INTERNAL MEDICINE

## 2020-06-04 PROCEDURE — 99213 OFFICE O/P EST LOW 20 MIN: CPT | Performed by: INTERNAL MEDICINE

## 2020-07-16 ENCOUNTER — TELEPHONE (OUTPATIENT)
Dept: INTERNAL MEDICINE CLINIC | Facility: CLINIC | Age: 30
End: 2020-07-16

## 2020-07-16 LAB
ALBUMIN SERPL-MCNC: 4.9 G/DL (ref 3.9–5)
ALBUMIN/GLOB SERPL: 1.9 {RATIO} (ref 1.2–2.2)
ALP SERPL-CCNC: 67 IU/L (ref 39–117)
ALT SERPL-CCNC: 12 IU/L (ref 0–32)
AST SERPL-CCNC: 17 IU/L (ref 0–40)
BASOPHILS # BLD AUTO: 0 X10E3/UL (ref 0–0.2)
BASOPHILS NFR BLD AUTO: 1 %
BILIRUB SERPL-MCNC: 0.6 MG/DL (ref 0–1.2)
BUN SERPL-MCNC: 12 MG/DL (ref 6–20)
BUN/CREAT SERPL: 11 (ref 9–23)
CALCIUM SERPL-MCNC: 9.6 MG/DL (ref 8.7–10.2)
CHLORIDE SERPL-SCNC: 103 MMOL/L (ref 96–106)
CHOLEST SERPL-MCNC: 201 MG/DL (ref 100–199)
CO2 SERPL-SCNC: 24 MMOL/L (ref 20–29)
CREAT SERPL-MCNC: 1.07 MG/DL (ref 0.57–1)
EOSINOPHIL # BLD AUTO: 0.1 X10E3/UL (ref 0–0.4)
EOSINOPHIL NFR BLD AUTO: 3 %
ERYTHROCYTE [DISTWIDTH] IN BLOOD BY AUTOMATED COUNT: 12.9 % (ref 11.7–15.4)
GLOBULIN SER-MCNC: 2.6 G/DL (ref 1.5–4.5)
GLUCOSE SERPL-MCNC: 80 MG/DL (ref 65–99)
HCT VFR BLD AUTO: 43.5 % (ref 34–46.6)
HDLC SERPL-MCNC: 62 MG/DL
HGB BLD-MCNC: 14.1 G/DL (ref 11.1–15.9)
IMM GRANULOCYTES # BLD: 0 X10E3/UL (ref 0–0.1)
IMM GRANULOCYTES NFR BLD: 0 %
LDLC SERPL CALC-MCNC: 123 MG/DL (ref 0–99)
LYMPHOCYTES # BLD AUTO: 1.9 X10E3/UL (ref 0.7–3.1)
LYMPHOCYTES NFR BLD AUTO: 36 %
MCH RBC QN AUTO: 29.8 PG (ref 26.6–33)
MCHC RBC AUTO-ENTMCNC: 32.4 G/DL (ref 31.5–35.7)
MCV RBC AUTO: 92 FL (ref 79–97)
MONOCYTES # BLD AUTO: 0.4 X10E3/UL (ref 0.1–0.9)
MONOCYTES NFR BLD AUTO: 7 %
NEUTROPHILS # BLD AUTO: 2.9 X10E3/UL (ref 1.4–7)
NEUTROPHILS NFR BLD AUTO: 53 %
PLATELET # BLD AUTO: 270 X10E3/UL (ref 150–450)
POTASSIUM SERPL-SCNC: 4.4 MMOL/L (ref 3.5–5.2)
PROT SERPL-MCNC: 7.5 G/DL (ref 6–8.5)
RBC # BLD AUTO: 4.73 X10E6/UL (ref 3.77–5.28)
SL AMB EGFR AFRICAN AMERICAN: 81 ML/MIN/1.73
SL AMB EGFR NON AFRICAN AMERICAN: 70 ML/MIN/1.73
SL AMB VLDL CHOLESTEROL CALC: 16 MG/DL (ref 5–40)
SODIUM SERPL-SCNC: 140 MMOL/L (ref 134–144)
TRIGL SERPL-MCNC: 82 MG/DL (ref 0–149)
TSH SERPL DL<=0.005 MIU/L-ACNC: 2.6 UIU/ML (ref 0.45–4.5)
WBC # BLD AUTO: 5.4 X10E3/UL (ref 3.4–10.8)

## 2020-07-16 NOTE — TELEPHONE ENCOUNTER
----- Message from Jose J Newton MD sent at 7/16/2020 12:48 PM EDT -----  Cholesterol higher than before, please cut down on fats

## 2021-01-18 ENCOUNTER — TELEPHONE (OUTPATIENT)
Dept: OBGYN CLINIC | Facility: CLINIC | Age: 31
End: 2021-01-18

## 2021-01-18 NOTE — TELEPHONE ENCOUNTER
----- Message from Elissa Andrew sent at 1/17/2021  2:56 PM EST -----  Regarding: Non-Urgent Medical Question  Contact: [phone]  Hi Doc Jerson Perez,    I wanted to schedule an appointment for tomorrow hopefully  I have been having a lot of menstrual bleeding today, more than usual  In the last 4 hours I have had to change my tampon 4 times  It seems like every time I did change, there are pretty large blood clots that come out  I've never had that happen before   There is no pain at all, just a lot more bleeding than normal

## 2021-03-26 ENCOUNTER — OFFICE VISIT (OUTPATIENT)
Dept: INTERNAL MEDICINE CLINIC | Facility: CLINIC | Age: 31
End: 2021-03-26
Payer: COMMERCIAL

## 2021-03-26 ENCOUNTER — APPOINTMENT (OUTPATIENT)
Dept: LAB | Facility: CLINIC | Age: 31
End: 2021-03-26
Payer: COMMERCIAL

## 2021-03-26 VITALS
SYSTOLIC BLOOD PRESSURE: 110 MMHG | OXYGEN SATURATION: 93 % | HEART RATE: 75 BPM | BODY MASS INDEX: 29.98 KG/M2 | HEIGHT: 64 IN | WEIGHT: 175.6 LBS | DIASTOLIC BLOOD PRESSURE: 74 MMHG | TEMPERATURE: 98.1 F

## 2021-03-26 DIAGNOSIS — Z13.220 SCREENING CHOLESTEROL LEVEL: ICD-10-CM

## 2021-03-26 DIAGNOSIS — Z13.6 SCREENING FOR CARDIOVASCULAR CONDITION: ICD-10-CM

## 2021-03-26 DIAGNOSIS — E78.2 HYPERLIPIDEMIA, MIXED: ICD-10-CM

## 2021-03-26 DIAGNOSIS — Z00.00 ANNUAL PHYSICAL EXAM: ICD-10-CM

## 2021-03-26 DIAGNOSIS — E03.9 ACQUIRED HYPOTHYROIDISM: Primary | ICD-10-CM

## 2021-03-26 LAB
ALBUMIN SERPL BCP-MCNC: 4.3 G/DL (ref 3.5–5)
ALP SERPL-CCNC: 60 U/L (ref 46–116)
ALT SERPL W P-5'-P-CCNC: 29 U/L (ref 12–78)
ANION GAP SERPL CALCULATED.3IONS-SCNC: 6 MMOL/L (ref 4–13)
AST SERPL W P-5'-P-CCNC: 15 U/L (ref 5–45)
BASOPHILS # BLD AUTO: 0.04 THOUSANDS/ΜL (ref 0–0.1)
BASOPHILS NFR BLD AUTO: 1 % (ref 0–1)
BILIRUB SERPL-MCNC: 0.53 MG/DL (ref 0.2–1)
BUN SERPL-MCNC: 15 MG/DL (ref 5–25)
CALCIUM SERPL-MCNC: 9.5 MG/DL (ref 8.3–10.1)
CHLORIDE SERPL-SCNC: 106 MMOL/L (ref 100–108)
CHOLEST SERPL-MCNC: 149 MG/DL (ref 50–200)
CO2 SERPL-SCNC: 26 MMOL/L (ref 21–32)
CREAT SERPL-MCNC: 0.93 MG/DL (ref 0.6–1.3)
EOSINOPHIL # BLD AUTO: 0.1 THOUSAND/ΜL (ref 0–0.61)
EOSINOPHIL NFR BLD AUTO: 2 % (ref 0–6)
ERYTHROCYTE [DISTWIDTH] IN BLOOD BY AUTOMATED COUNT: 12.6 % (ref 11.6–15.1)
GFR SERPL CREATININE-BSD FRML MDRD: 83 ML/MIN/1.73SQ M
GLUCOSE P FAST SERPL-MCNC: 79 MG/DL (ref 65–99)
HCT VFR BLD AUTO: 41.4 % (ref 34.8–46.1)
HDLC SERPL-MCNC: 52 MG/DL
HGB BLD-MCNC: 13.5 G/DL (ref 11.5–15.4)
IMM GRANULOCYTES # BLD AUTO: 0.02 THOUSAND/UL (ref 0–0.2)
IMM GRANULOCYTES NFR BLD AUTO: 0 % (ref 0–2)
LDLC SERPL CALC-MCNC: 85 MG/DL (ref 0–100)
LYMPHOCYTES # BLD AUTO: 1.9 THOUSANDS/ΜL (ref 0.6–4.47)
LYMPHOCYTES NFR BLD AUTO: 32 % (ref 14–44)
MCH RBC QN AUTO: 29.9 PG (ref 26.8–34.3)
MCHC RBC AUTO-ENTMCNC: 32.6 G/DL (ref 31.4–37.4)
MCV RBC AUTO: 92 FL (ref 82–98)
MONOCYTES # BLD AUTO: 0.51 THOUSAND/ΜL (ref 0.17–1.22)
MONOCYTES NFR BLD AUTO: 9 % (ref 4–12)
NEUTROPHILS # BLD AUTO: 3.37 THOUSANDS/ΜL (ref 1.85–7.62)
NEUTS SEG NFR BLD AUTO: 56 % (ref 43–75)
NONHDLC SERPL-MCNC: 97 MG/DL
NRBC BLD AUTO-RTO: 0 /100 WBCS
PLATELET # BLD AUTO: 228 THOUSANDS/UL (ref 149–390)
PMV BLD AUTO: 9.7 FL (ref 8.9–12.7)
POTASSIUM SERPL-SCNC: 4.4 MMOL/L (ref 3.5–5.3)
PROT SERPL-MCNC: 7.9 G/DL (ref 6.4–8.2)
RBC # BLD AUTO: 4.51 MILLION/UL (ref 3.81–5.12)
SODIUM SERPL-SCNC: 138 MMOL/L (ref 136–145)
TRIGL SERPL-MCNC: 58 MG/DL
WBC # BLD AUTO: 5.94 THOUSAND/UL (ref 4.31–10.16)

## 2021-03-26 PROCEDURE — 80061 LIPID PANEL: CPT

## 2021-03-26 PROCEDURE — 1036F TOBACCO NON-USER: CPT | Performed by: NURSE PRACTITIONER

## 2021-03-26 PROCEDURE — 80053 COMPREHEN METABOLIC PANEL: CPT

## 2021-03-26 PROCEDURE — 3008F BODY MASS INDEX DOCD: CPT | Performed by: NURSE PRACTITIONER

## 2021-03-26 PROCEDURE — 36415 COLL VENOUS BLD VENIPUNCTURE: CPT

## 2021-03-26 PROCEDURE — 85025 COMPLETE CBC W/AUTO DIFF WBC: CPT

## 2021-03-26 PROCEDURE — 99395 PREV VISIT EST AGE 18-39: CPT | Performed by: NURSE PRACTITIONER

## 2021-03-26 NOTE — PATIENT INSTRUCTIONS
Good fat  Good fats come mainly from vegetables, nuts, seeds, and fish  They differ from saturated fats by having fewer hydrogen atoms bonded to their carbon chains  Healthy fats are liquid at room temperature, not solid  There are two broad categories of beneficial fats: monounsaturated and polyunsaturated fats  Monounsaturated fats  When you dip your bread in olive oil at an [place], you're getting mostly monounsaturated fat  Monounsaturated fats have a single carbon-to-carbon double bond  The result is that it has two fewer hydrogen atoms than a saturated fat and a bend at the double bond  This structure keeps monounsaturated fats liquid at room temperature  Good sources of monounsaturated fats are olive oil, peanut oil, canola oil, avocados, and most nuts, as well as high-oleic safflower and sunflower oils  The discovery that monounsaturated fat could be healthful came from the [place] Study during the 1960s  It revealed that people in [place] and other parts of the [facility] enjoyed a low rate of heart disease despite a high-fat diet  The main fat in their diet, though, was not the saturated animal fat common in countries with higher rates of heart disease  It was olive oil, which contains mainly monounsaturated fat  This finding produced a surge of interest in olive oil and the "Mediterranean diet," a style of eating regarded as a healthful choice today  Although there's no recommended daily intake of monounsaturated fats, the [place] [facility] recommends using them as much as possible along with polyunsaturated fats to replace saturated and trans fats  Polyunsaturated fats  When you pour liquid cooking oil into a pan, there's a good chance you're using polyunsaturated fat  Corn oil, sunflower oil, and safflower oil are common examples  Polyunsaturated fats are essential fats  That means they're required for normal body functions but your body can't make them   So you must get them from food  Polyunsaturated fats are used to build cell membranes and the covering of nerves  They are needed for blood clotting, muscle movement, and inflammation  A polyunsaturated fat has two or more double bonds in its carbon chain  There are two main types of polyunsaturated fats: omega-3 fatty acids and omega-6 fatty acids  The numbers refer to the distance between the beginning of the carbon chain and the first double bond  Both types offer health benefits  Eating polyunsaturated fats in place of saturated fats or highly refined carbohydrates reduces harmful LDL cholesterol and improves the cholesterol profile  It also lowers triglycerides  Good sources of omega-3 fatty acids include fatty fish such as salmon, mackerel, and sardines, flaxseeds, walnuts, canola oil, and unhydrogenated soybean oil  Omega-3 fatty acids may help prevent and even treat heart disease and stroke  In addition to reducing blood pressure, raising HDL, and lowering triglycerides, polyunsaturated fats may help prevent lethal heart rhythms from arising  Evidence also suggests they may help reduce the need for corticosteroid medications in people with rheumatoid arthritis  Studies linking omega-3s to a wide range of other health improvements, including reducing risk of dementia, are inconclusive, and some of them have major flaws, according to a systematic review of the evidence by the Agency for Healthcare Research and Quality  Omega-6 fatty acids have also been linked to protection against heart disease  Foods rich in linoleic acid and other omega-6 fatty acids include vegetable oils such as safflower, soybean, sunflower, walnut, and corn oils  Wellness Visit for Adults   AMBULATORY CARE:   A wellness visit  is when you see your healthcare provider to get screened for health problems  Your healthcare provider will also give you advice on how to stay healthy   Write down your questions so you remember to ask them  Ask your healthcare provider how often you should have a wellness visit  What happens at a wellness visit:  Your healthcare provider will ask about your health, and your family history of health problems  This includes high blood pressure, heart disease, and cancer  He or she will ask if you have symptoms that concern you, if you smoke, and about your mood  You may also be asked about your intake of medicines, supplements, food, and alcohol  Any of the following may be done:  · Your weight  will be checked  Your height may also be checked so your body mass index (BMI) can be calculated  Your BMI shows if you are at a healthy weight  · Your blood pressure  and heart rate will be checked  Your temperature may also be checked  · Blood and urine tests  may be done  Blood tests may be done to check your cholesterol levels  Abnormal cholesterol levels increase your risk for heart disease and stroke  You may also need a blood or urine test to check for diabetes if you are at increased risk  Urine tests may be done to look for signs of an infection or kidney disease  · A physical exam  includes checking your heartbeat and lungs with a stethoscope  Your healthcare provider may also check your skin to look for sun damage  · Screening tests  may be recommended  A screening test is done to check for diseases that may not cause symptoms  The screening tests you may need depend on your age, gender, family history, and lifestyle habits  For example, colorectal screening may be recommended if you are 48years old or older  Screening tests you need if you are a woman:   · A Pap smear  is used to screen for cervical cancer  Pap smears are usually done every 3 to 5 years depending on your age  You may need them more often if you have had abnormal Pap smear test results in the past  Ask your healthcare provider how often you should have a Pap smear      · A mammogram  is an x-ray of your breasts to screen for breast cancer  Experts recommend mammograms every 2 years starting at age 48 years  You may need a mammogram at age 52 years or younger if you have an increased risk for breast cancer  Talk to your healthcare provider about when you should start having mammograms and how often you need them  Vaccines you may need:   · Get an influenza vaccine  every year  The influenza vaccine protects you from the flu  Several types of viruses cause the flu  The viruses change over time, so new vaccines are made each year  · Get a tetanus-diphtheria (Td) booster vaccine  every 10 years  This vaccine protects you against tetanus and diphtheria  Tetanus is a severe infection that may cause painful muscle spasms and lockjaw  Diphtheria is a severe bacterial infection that causes a thick covering in the back of your mouth and throat  · Get a human papillomavirus (HPV) vaccine  if you are female and aged 23 to 32 or male 23 to 24 and never received it  This vaccine protects you from HPV infection  HPV is the most common infection spread by sexual contact  HPV may also cause vaginal, penile, and anal cancers  · Get a pneumococcal vaccine  if you are aged 72 years or older  The pneumococcal vaccine is an injection given to protect you from pneumococcal disease  Pneumococcal disease is an infection caused by pneumococcal bacteria  The infection may cause pneumonia, meningitis, or an ear infection  · Get a shingles vaccine  if you are 60 or older, even if you have had shingles before  The shingles vaccine is an injection to protect you from the varicella-zoster virus  This is the same virus that causes chickenpox  Shingles is a painful rash that develops in people who had chickenpox or have been exposed to the virus  How to eat healthy:  My Plate is a model for planning healthy meals  It shows the types and amounts of foods that should go on your plate   Fruits and vegetables make up about half of your plate, and grains and protein make up the other half  A serving of dairy is included on the side of your plate  The amount of calories and serving sizes you need depends on your age, gender, weight, and height  Examples of healthy foods are listed below:  · Eat a variety of vegetables  such as dark green, red, and orange vegetables  You can also include canned vegetables low in sodium (salt) and frozen vegetables without added butter or sauces  · Eat a variety of fresh fruits , canned fruit in 100% juice, frozen fruit, and dried fruit  · Include whole grains  At least half of the grains you eat should be whole grains  Examples include whole-wheat bread, wheat pasta, brown rice, and whole-grain cereals such as oatmeal     · Eat a variety of protein foods such as seafood (fish and shellfish), lean meat, and poultry without skin (turkey and chicken)  Examples of lean meats include pork leg, shoulder, or tenderloin, and beef round, sirloin, tenderloin, and extra lean ground beef  Other protein foods include eggs and egg substitutes, beans, peas, soy products, nuts, and seeds  · Choose low-fat dairy products such as skim or 1% milk or low-fat yogurt, cheese, and cottage cheese  · Limit unhealthy fats  such as butter, hard margarine, and shortening  Exercise:  Exercise at least 30 minutes per day on most days of the week  Some examples of exercise include walking, biking, dancing, and swimming  You can also fit in more physical activity by taking the stairs instead of the elevator or parking farther away from stores  Include muscle strengthening activities 2 days each week  Regular exercise provides many health benefits  It helps you manage your weight, and decreases your risk for type 2 diabetes, heart disease, stroke, and high blood pressure  Exercise can also help improve your mood  Ask your healthcare provider about the best exercise plan for you  General health and safety guidelines:   · Do not smoke    Nicotine and other chemicals in cigarettes and cigars can cause lung damage  Ask your healthcare provider for information if you currently smoke and need help to quit  E-cigarettes or smokeless tobacco still contain nicotine  Talk to your healthcare provider before you use these products  · Limit alcohol  A drink of alcohol is 12 ounces of beer, 5 ounces of wine, or 1½ ounces of liquor  · Lose weight, if needed  Being overweight increases your risk of certain health conditions  These include heart disease, high blood pressure, type 2 diabetes, and certain types of cancer  · Protect your skin  Do not sunbathe or use tanning beds  Use sunscreen with a SPF 15 or higher  Apply sunscreen at least 15 minutes before you go outside  Reapply sunscreen every 2 hours  Wear protective clothing, hats, and sunglasses when you are outside  · Drive safely  Always wear your seatbelt  Make sure everyone in your car wears a seatbelt  A seatbelt can save your life if you are in an accident  Do not use your cell phone when you are driving  This could distract you and cause an accident  Pull over if you need to make a call or send a text message  · Practice safe sex  Use latex condoms if are sexually active and have more than one partner  Your healthcare provider may recommend screening tests for sexually transmitted infections (STIs)  · Wear helmets, lifejackets, and protective gear  Always wear a helmet when you ride a bike or motorcycle, go skiing, or play sports that could cause a head injury  Wear protective equipment when you play sports  Wear a lifejacket when you are on a boat or doing water sports  © Copyright [address] Information is for End User's use only and may not be sold, redistributed or otherwise used for commercial purposes  All illustrations and images included in CareNotes® are the copyrighted property of [employer]  or [facility] Toyin Babb   The above information is an  only  It is not intended as medical advice for individual conditions or treatments  Talk to your doctor, nurse or pharmacist before following any medical regimen to see if it is safe and effective for you

## 2021-03-26 NOTE — PROGRESS NOTES
ADULT ANNUAL PHYSICAL  [address]    NAME: Jovan Dixon  AGE: 27 y o  SEX: female  : 1990     DATE: 3/26/2021     Assessment and Plan:     Problem List Items Addressed This Visit        Endocrine    Acquired hypothyroidism - Primary     Labs stable on current medication  Continue synthroid 150 mg daily  Following with endo as needed  Relevant Medications    Levothyroxine Sodium (SYNTHROID PO)       Other    Hyperlipidemia, mixed     Will recheck lipid panel today  Patient started a Keto diet about 3 months ago and states she lost about 23 lbs  Other Visit Diagnoses     Screening cholesterol level        Relevant Orders    Lipid panel    Screening for cardiovascular condition        Relevant Orders    CBC and differential    Comprehensive metabolic panel    Annual physical exam              Immunizations and preventive care screenings were discussed with patient today  Appropriate education was printed on patient's after visit summary  Counseling:  Alcohol/drug use: discussed moderation in alcohol intake, the recommendations for healthy alcohol use, and avoidance of illicit drug use  Dental Health: discussed importance of regular tooth brushing, flossing, and dental visits  Injury prevention: discussed safety/seat belts, safety helmets, smoke detectors, carbon dioxide detectors, and smoking near bedding or upholstery  Sexual health: discussed sexually transmitted diseases, partner selection, use of condoms, avoidance of unintended pregnancy, and contraceptive alternatives  · Exercise: the importance of regular exercise/physical activity was discussed  Recommend exercise 3-5 times per week for at least 30 minutes  BMI Counseling: Body mass index is 30 14 kg/m²   The BMI is above normal  Nutrition recommendations include encouraging healthy choices of fruits and vegetables, moderation in carbohydrate intake, increasing intake of lean protein, reducing intake of saturated and trans fat and reducing intake of cholesterol  Exercise recommendations include moderate physical activity 150 minutes/week, exercising 3-5 times per week and strength training exercises  No pharmacotherapy was ordered  Return in about 1 year (around 3/26/2022), or if symptoms worsen or fail to improve, for Annual physical      Chief Complaint:     Chief Complaint   Patient presents with    Annual Exam      History of Present Illness:     Adult Annual Physical   Patient here for a comprehensive physical exam  The patient reports no problems  States she gained weight last year and was up to 195 lb in December  In January she started a keto diet and lost over 20 lbs  She is exercising about 4 times per week and feels a lot better now  Diet and Physical Activity  · Diet/Nutrition: well balanced diet, limited junk food, low calorie diet, high fat diet, low carb diet and following a keto diet for the past 3 mo nths  · Exercise: moderate cardiovascular exercise, strength training exercises, 3-4 times a week on average and 30-60 minutes on average  Depression Screening  PHQ-9 Depression Screening    PHQ-9:   Frequency of the following problems over the past two weeks:           General Health  · Sleep: sleeps well  · Hearing: normal - bilateral   · Vision: no vision problems  · Dental: regular dental visits, no dental visits for >1 year and brushes teeth twice daily  /GYN Health  · Last menstrual period:   · Contraceptive method: barrier methods  · History of STDs?: no      Review of Systems:     Review of Systems   Constitutional: Negative for chills, fatigue and fever  HENT: Negative for congestion, dental problem, postnasal drip and sore throat  Eyes: Negative for visual disturbance  Respiratory: Negative for cough, chest tightness and shortness of breath  Cardiovascular: Negative for chest pain  Gastrointestinal: Negative for abdominal pain  Genitourinary: Negative for dysuria and menstrual problem  Musculoskeletal: Negative for arthralgias and myalgias  Skin: Negative for rash  Neurological: Negative for dizziness and headaches  Psychiatric/Behavioral: Negative for sleep disturbance  The patient is not nervous/anxious         Past Medical History:     Past Medical History:   Diagnosis Date    Migraine     Normal delivery     2014 son    Sciatica     Thyroid disease     Thyroid dysfunction in pregnancy, antepartum     last assessed 09/02/14    Varicella       Past Surgical History:     Past Surgical History:   Procedure Laterality Date    TOOTH EXTRACTION        Social History:     E-Cigarette/Vaping    E-Cigarette Use Never User      E-Cigarette/Vaping Substances    Nicotine No     THC No     CBD No     Flavoring No     Other No     Unknown No      Social History     Socioeconomic History    Marital status: /Civil Union     Spouse name: None    Number of children: None    Years of education: None    Highest education level: None   Occupational History    None   Social Needs    Financial resource strain: None    Food insecurity     Worry: None     Inability: None    Transportation needs     Medical: None     Non-medical: None   Tobacco Use    Smoking status: Never Smoker    Smokeless tobacco: Never Used   Substance and Sexual Activity    Alcohol use: Yes     Comment: Socially    Drug use: No    Sexual activity: Yes     Partners: Male     Comment:    Lifestyle    Physical activity     Days per week: 0 days     Minutes per session: 0 min    Stress: None   Relationships    Social connections     Talks on phone: None     Gets together: None     Attends Voodoo service: None     Active member of club or organization: None     Attends meetings of clubs or organizations: None     Relationship status: None    Intimate partner violence     Fear of current or ex partner: None     Emotionally abused: None     Physically abused: None     Forced sexual activity: None   Other Topics Concern    None   Social History Narrative    Caffeine use    Exercising erratically    Uses safety equipment seatbelts      Family History:     Family History   Problem Relation Age of Onset    Hypertension Mother     Anemia Family     Arthritis Family     Diabetes Family     Hypertension Family     Kidney disease Family     Heart disease Family     No Known Problems Father     Arthritis Sister     No Known Problems Brother     No Known Problems Son     No Known Problems Paternal Grandmother     Kidney disease Maternal Uncle       Current Medications:     Current Outpatient Medications   Medication Sig Dispense Refill    Levothyroxine Sodium (SYNTHROID PO) Take 150 mg by mouth daily       No current facility-administered medications for this visit  Allergies:     No Known Allergies   Physical Exam:     /74 (BP Location: Left arm, Patient Position: Sitting, Cuff Size: Standard)   Pulse 75   Temp 98 1 °F (36 7 °C) (Temporal) Comment: no nsaids  Ht 5' 4" (1 626 m)   Wt 79 7 kg (175 lb 9 6 oz)   SpO2 93%   BMI 30 14 kg/m²     Physical Exam  Vitals signs reviewed  Constitutional:       Appearance: Normal appearance  She is well-developed  She is not ill-appearing  HENT:      Head: Normocephalic and atraumatic  Right Ear: Hearing, tympanic membrane, ear canal and external ear normal       Left Ear: Hearing, tympanic membrane, ear canal and external ear normal    Eyes:      General: Lids are normal       Extraocular Movements: Extraocular movements intact  Conjunctiva/sclera: Conjunctivae normal    Neck:      Musculoskeletal: Full passive range of motion without pain and normal range of motion  Thyroid: No thyroid mass or thyromegaly  Trachea: Trachea and phonation normal    Cardiovascular:      Rate and Rhythm: Normal rate and regular rhythm  Heart sounds: Normal heart sounds, S1 normal and S2 normal    Pulmonary:      Effort: Pulmonary effort is normal  No accessory muscle usage  Breath sounds: Normal breath sounds  No wheezing  Abdominal:      General: Abdomen is flat  Bowel sounds are normal       Palpations: Abdomen is soft  Tenderness: There is no abdominal tenderness  Lymphadenopathy:      Cervical: No cervical adenopathy  Skin:     General: Skin is warm and dry  Capillary Refill: Capillary refill takes less than 2 seconds  Neurological:      General: No focal deficit present  Mental Status: She is alert  Motor: Motor function is intact  Psychiatric:         Attention and Perception: Attention and perception normal          Mood and Affect: Mood and affect normal          Speech: Speech normal          Behavior: Behavior normal  Behavior is cooperative  Thought Content:  Thought content normal          Cognition and Memory: Cognition and memory normal          Judgment: Judgment normal           [facility] [place], [address] [facility]

## 2021-03-29 ENCOUNTER — TELEPHONE (OUTPATIENT)
Dept: INTERNAL MEDICINE CLINIC | Facility: CLINIC | Age: 31
End: 2021-03-29

## 2021-03-29 NOTE — TELEPHONE ENCOUNTER
----- Message from Sunshine Almonte, [address] sent at 3/29/2021  7:52 AM EDT -----  Please let patient know all labs are normal  Cholesterol levels back in normal range  Thanks

## 2021-03-30 DIAGNOSIS — Z23 ENCOUNTER FOR IMMUNIZATION: ICD-10-CM

## 2021-04-15 ENCOUNTER — IMMUNIZATIONS (OUTPATIENT)
Dept: FAMILY MEDICINE CLINIC | Facility: HOSPITAL | Age: 31
End: 2021-04-15

## 2021-04-15 DIAGNOSIS — Z23 ENCOUNTER FOR IMMUNIZATION: Primary | ICD-10-CM

## 2021-04-15 PROCEDURE — 91300 SARS-COV-2 / COVID-19 MRNA VACCINE (PFIZER-BIONTECH) 30 MCG: CPT

## 2021-04-15 PROCEDURE — 0001A SARS-COV-2 / COVID-19 MRNA VACCINE (PFIZER-BIONTECH) 30 MCG: CPT

## 2021-04-28 ENCOUNTER — TELEPHONE (OUTPATIENT)
Dept: INTERNAL MEDICINE CLINIC | Facility: CLINIC | Age: 31
End: 2021-04-28

## 2021-04-28 NOTE — TELEPHONE ENCOUNTER
CARLOS [place] pt will get tested for covid today at [facility]    she was in direct contact with someone who was positive   will inform  of the results

## 2021-04-30 NOTE — TELEPHONE ENCOUNTER
Patient is negative for COVID  Received her test results yesterday 4/29  Is she okay to get her Covid booster?

## 2021-05-07 ENCOUNTER — TELEPHONE (OUTPATIENT)
Dept: INTERNAL MEDICINE CLINIC | Facility: CLINIC | Age: 31
End: 2021-05-07

## 2021-05-07 NOTE — TELEPHONE ENCOUNTER
She should not get the 2nd shot yet  Please wait for any symptoms or otherwise get tested for COVID early next week    If you family members are positive, please get the vaccine after 2 weeks

## 2021-05-07 NOTE — TELEPHONE ENCOUNTER
CALLED PT   AND WAS NOTIFIED AND IS NOT HAVING ANY SX AND IS WONDERING IF SHE GOES ALL WEEKEND WITH NO SX SHOULD SHE GET SHOT OR WAIT THE  2 WEEKS

## 2021-05-07 NOTE — TELEPHONE ENCOUNTER
Both her son's have been sick and one tested positive now her  is not feeling well he is getting a test today to see if he has covid  She was just wondering if she should still get her 2nd shot on Monday   Please call patient [phone]

## 2021-05-10 ENCOUNTER — TELEPHONE (OUTPATIENT)
Dept: INTERNAL MEDICINE CLINIC | Facility: CLINIC | Age: 31
End: 2021-05-10

## 2021-05-10 DIAGNOSIS — Z20.822 EXPOSURE TO COVID-19 VIRUS: Primary | ICD-10-CM

## 2021-05-10 NOTE — TELEPHONE ENCOUNTER
CALLED PT  AND WAS NOTIFIED AND STATED SHE IS GOING TO REACH OUT TO DEPT   OF HEALTH AND SE WHEN SHE SHOULD GET TESTED AS SHE HAS ZULEMA EXPOSED TO POSITIVE PEOPLE  AND HAS NOT HAD ANY SX FOR 2 WEEKS AND SHE WILL CALL US BACK AS TO WHEN SHE SHOULD BE TESTED

## 2021-05-10 NOTE — TELEPHONE ENCOUNTER
FYI   Pt has an order to have the covid test done  BALDEMAR would like her to have it done on 5/18/2021, Pt  is positive, pt has been in direct contact but no symptoms  Please put pt in the Covid testing book for 5/18/2021 at 3:00  Also the pt has her 2nd vaccine on 5/24/2021 is this ok?   I guess wait to see what the result are in regards to 2nd vaccine

## 2021-05-10 NOTE — TELEPHONE ENCOUNTER
Patients  is positive and she needs covid test per health dept   You can reach her at [phone] when order is in an if she can come here for the test

## 2021-05-17 ENCOUNTER — TELEMEDICINE (OUTPATIENT)
Dept: INTERNAL MEDICINE CLINIC | Facility: CLINIC | Age: 31
End: 2021-05-17
Payer: COMMERCIAL

## 2021-05-17 DIAGNOSIS — Z20.822 EXPOSURE TO COVID-19 VIRUS: Primary | ICD-10-CM

## 2021-05-17 PROCEDURE — 99213 OFFICE O/P EST LOW 20 MIN: CPT | Performed by: NURSE PRACTITIONER

## 2021-05-17 PROCEDURE — U0005 INFEC AGEN DETEC AMPLI PROBE: HCPCS | Performed by: NURSE PRACTITIONER

## 2021-05-17 PROCEDURE — U0003 INFECTIOUS AGENT DETECTION BY NUCLEIC ACID (DNA OR RNA); SEVERE ACUTE RESPIRATORY SYNDROME CORONAVIRUS 2 (SARS-COV-2) (CORONAVIRUS DISEASE [COVID-19]), AMPLIFIED PROBE TECHNIQUE, MAKING USE OF HIGH THROUGHPUT TECHNOLOGIES AS DESCRIBED BY CMS-2020-01-R: HCPCS | Performed by: NURSE PRACTITIONER

## 2021-05-17 NOTE — PROGRESS NOTES
COVID-19 Outpatient Progress Note    Assessment/Plan:    Problem List Items Addressed This Visit     None      Visit Diagnoses     Exposure to COVID-19 virus    -  Primary    Relevant Orders    Novel Coronavirus (Covid-19),PCR [facility] - Collected in Office         Disposition:     I recommended the patient to come to our office to perform PCR testing for COVID-19  [facility]   States that she began with symptoms of shortness of breath, congestion, sneezing, and sinus pressure on Wednesday May 12  Previous to that time her  and other members of her household had all tested positive for COVID  She did do a rapid COVID test at home on Friday May 14th which was positive  There is no documentation of that rapid test   The patient would like to be retested for documentation purposes for her employer  She will have COVID testing done today  The patient is using Tylenol Severe cold and flu for symptom management  I have spent 15 minutes directly with the patient  Greater than 50% of this time was spent in counseling/coordination of care regarding: instructions for management  COVID testing symptom management, self quarantine  Encounter provider DANNY Schafer    Provider located at [address] [place] [address]    Recent Visits  Date Type Provider Dept   05/10/21 Telephone [address] [facility] recent visits within past 7 days and meeting all other requirements     Today's Visits  Date Type Provider Dept   05/17/21 Telemedicine Gabriel Romo today's visits and meeting all other requirements     Future Appointments  No visits were found meeting these conditions     Showing future appointments within next 150 days and meeting all other requirements      This virtual check-in was done via [employer] and patient was informed that this is not a secure, HIPAA-compliant platform  She agrees to proceed  Patient agrees to participate in a virtual check in via telephone or video visit instead of presenting to the office to address urgent/immediate medical needs  Patient is aware this is a billable service  After connecting through [facility], the patient was identified by name and date of birth  Meryle Petite was informed that this was a telemedicine visit and that the exam was being conducted confidentially over secure lines  My office door was closed  No one else was in the room  Meryle Petite acknowledged consent and understanding of privacy and security of the telemedicine visit  I informed the patient that I have reviewed her record in Epic and presented the opportunity for her to ask any questions regarding the visit today  The patient agreed to participate  Subjective:   Meryle Petite is a 27 y o  female who is concerned about COVID-19  Patient's symptoms include fatigue, nasal congestion, cough, shortness of breath and myalgias  Patient denies fever, rhinorrhea, chest tightness, abdominal pain, nausea, vomiting and headaches       Date of symptom onset: 5/12/2021  Date of exposure: 5/5/2021    Exposure:   Contact with a person who is under investigation (PUI) for or who is positive for COVID-19 within the last 14 days?: Yes    Hospitalized recently for fever and/or lower respiratory symptoms?: No      Currently a healthcare worker that is involved in direct patient care?: No      Works in a special setting where the risk of COVID-19 transmission may be high? (this may include long-term care, correctional and residential facilities; homeless shelters; assisted-living facilities and group homes ): No      Resident in a special setting where the risk of COVID-19 transmission may be high? (this may include long-term care, correctional and residential facilities; homeless shelters; assisted-living facilities and group homes ): No Lab Results   Component Value Date    [facility] [address] [facility] Not Detected 04/28/2021     Past Medical History:   Diagnosis Date    Migraine     Normal delivery     2014 son    Sciatica     Thyroid disease     Thyroid dysfunction in pregnancy, antepartum     last assessed 09/02/14    Varicella      Past Surgical History:   Procedure Laterality Date    TOOTH EXTRACTION       Current Outpatient Medications   Medication Sig Dispense Refill    Levothyroxine Sodium (SYNTHROID PO) Take 150 mg by mouth daily       No current facility-administered medications for this visit  No Known Allergies    Review of Systems   Constitutional: Positive for fatigue  Negative for activity change and fever  HENT: Positive for congestion, sinus pressure and sneezing  Negative for hearing loss, rhinorrhea, trouble swallowing and voice change  Eyes: Negative for photophobia, pain, discharge and visual disturbance  Respiratory: Positive for cough and shortness of breath  Negative for chest tightness  Cardiovascular: Negative for chest pain, palpitations and leg swelling  Gastrointestinal: Negative for abdominal pain, blood in stool, constipation, nausea and vomiting  Endocrine: Negative for cold intolerance and heat intolerance  Genitourinary: Negative for difficulty urinating, frequency, hematuria, urgency, vaginal bleeding and vaginal discharge  Musculoskeletal: Positive for myalgias  Negative for arthralgias  Skin: Negative  Neurological: Negative for dizziness, weakness, numbness and headaches  Psychiatric/Behavioral: Negative for decreased concentration  The patient is not nervous/anxious  Objective: There were no vitals filed for this visit  Physical Exam  Constitutional:       General: She is not in acute distress  Appearance: Normal appearance  She is well-developed  HENT:      Head: Normocephalic and atraumatic  Eyes:      Pupils: Pupils are equal, round, and reactive to light  Neck:      Musculoskeletal: Normal range of motion  Pulmonary:      Effort: Pulmonary effort is normal    Neurological:      Mental Status: She is alert and oriented to person, place, and time  Psychiatric:         Mood and Affect: Mood normal          Behavior: Behavior normal          Thought Content: Thought content normal          Judgment: Judgment normal        VIRTUAL VISIT DISCLAIMER    Aldair Greer acknowledges that she has consented to an online visit or consultation  She understands that the online visit is based solely on information provided by her, and that, in the absence of a face-to-face physical evaluation by the physician, the diagnosis she receives is both limited and provisional in terms of accuracy and completeness  This is not intended to replace a full medical face-to-face evaluation by the physician  Aldair Greer understands and accepts these terms

## 2021-05-17 NOTE — TELEPHONE ENCOUNTER
Pt is on her way now for the covid swab    is about 15 min's away    said she didn't realize she needed to be here @ 3

## 2021-05-18 LAB — SARS-COV-2 RNA RESP QL NAA+PROBE: POSITIVE

## 2021-05-19 ENCOUNTER — TELEPHONE (OUTPATIENT)
Dept: INTERNAL MEDICINE CLINIC | Facility: CLINIC | Age: 31
End: 2021-05-19

## 2021-05-19 ENCOUNTER — TELEMEDICINE (OUTPATIENT)
Dept: INTERNAL MEDICINE CLINIC | Facility: CLINIC | Age: 31
End: 2021-05-19
Payer: COMMERCIAL

## 2021-05-19 DIAGNOSIS — U07.1 COVID-19: Primary | ICD-10-CM

## 2021-05-19 PROCEDURE — 99213 OFFICE O/P EST LOW 20 MIN: CPT | Performed by: NURSE PRACTITIONER

## 2021-05-19 PROCEDURE — 1036F TOBACCO NON-USER: CPT | Performed by: NURSE PRACTITIONER

## 2021-05-19 NOTE — TELEPHONE ENCOUNTER
----- Message from Tod Herrera, [address]  sent at 5/19/2021  8:23 AM EDT -----  Please call the patient and make her aware she is positive for COVID  Please make a virtual appointment with me either today or tomorrow

## 2021-05-19 NOTE — PROGRESS NOTES
COVID-19 Outpatient Progress Note    Assessment/Plan:    Problem List Items Addressed This Visit     None         Disposition:     I recommended continued isolation until at least 24 hours have passed since recovery defined as resolution of fever without the use of fever-reducing medications AND improvement in COVID symptoms AND 10 days have passed since onset of symptoms (or 10 days have passed since date of first positive viral diagnostic test for asymptomatic patients)  Lexus Martines   Began with symptoms of COVID-19 on May 12th  Prior to that time her 2 children and  had all tested positive  The patient performed a home rapid test on Friday May 14th which was positive  I did speak with her on May 17th and she 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  She was positive again  Currently the patient's symptoms have started to subside  She still has some sinus pressure and nasal congestion and sneezing however her fatigue and fever have subsided  She will continue to treat her symptoms at home  The patient requests to go back to work on Wednesday May 26  I will provide her a note will be sent through [address]  I did recommend to the patient that she postpone her 2nd COVID vaccine to the 1st week of June  I have spent 15 minutes directly with the patient   Symptom management, return to work        Encounter provider Jessi Cheney [facility]    Provider located at [address] [place] [address]    Recent Visits  Date Type Provider Dept   05/17/21 Telemedicine Gabriel Davenport recent visits within past 7 days and meeting all other requirements     Today's Visits  Date Type Provider Dept   05/19/21 Telemedicine Duran Davenport    05/19/21 Telephone Marvella Apley, [address] today's visits and meeting all other requirements     Future Appointments  No visits were found meeting these conditions  Showing future appointments within next 150 days and meeting all other requirements      This virtual check-in was done via [employer] and patient was informed that this is a secure, HIPAA-compliant platform  She agrees to proceed  Patient agrees to participate in a virtual check in via telephone or video visit instead of presenting to the office to address urgent/immediate medical needs  Patient is aware this is a billable service  After connecting through [facility], the patient was identified by name and date of birth  She Lord was informed that this was a telemedicine visit and that the exam was being conducted confidentially over secure lines  My office door was closed  No one else was in the room  She Lord acknowledged consent and understanding of privacy and security of the telemedicine visit  I informed the patient that I have reviewed her record in Epic and presented the opportunity for her to ask any questions regarding the visit today  The patient agreed to participate  Subjective:   She Lord is a 27 y o  female who has been screened for COVID-19  Symptom change since last report: resolving  Patient's symptoms include nasal congestion  Patient denies fever, fatigue, rhinorrhea, cough, shortness of breath, chest tightness, abdominal pain, nausea, vomiting, myalgias and headaches  Kayla Garsia has been staying home and has isolated themselves in her home  She is taking care to not share personal items and is cleaning all surfaces that are touched often, like counters, tabletops, and doorknobs using household cleaning sprays or wipes  She is wearing a mask when she leaves her room       Date of symptom onset: 5/12/2021  Date of positive COVID-19 PCR: 5/12/2021    Lab Results   Component Value Date    SARSCOV2 Positive (A) 05/17/2021    [facility] [place] [address] [facility] Not Detected 04/28/2021     Past Medical History:   Diagnosis Date    Migraine     Normal delivery     2014 son    Sciatica     Thyroid disease     Thyroid dysfunction in pregnancy, antepartum     last assessed 09/02/14    Varicella      Past Surgical History:   Procedure Laterality Date    TOOTH EXTRACTION       Current Outpatient Medications   Medication Sig Dispense Refill    Levothyroxine Sodium (SYNTHROID PO) Take 150 mg by mouth daily       No current facility-administered medications for this visit  No Known Allergies    Review of Systems   Constitutional: Negative for activity change, fatigue and fever  HENT: Positive for congestion, sinus pressure and sneezing  Negative for hearing loss, rhinorrhea, trouble swallowing and voice change  Eyes: Negative for photophobia, pain, discharge and visual disturbance  Respiratory: Negative for cough, chest tightness and shortness of breath  Cardiovascular: Negative for chest pain, palpitations and leg swelling  Gastrointestinal: Negative for abdominal pain, blood in stool, constipation, nausea and vomiting  Endocrine: Negative for cold intolerance and heat intolerance  Genitourinary: Negative for difficulty urinating, frequency, hematuria, urgency, vaginal bleeding and vaginal discharge  Musculoskeletal: Negative for arthralgias and myalgias  Skin: Negative  Neurological: Negative for dizziness, weakness, numbness and headaches  Psychiatric/Behavioral: Negative for decreased concentration  The patient is not nervous/anxious  Objective: There were no vitals filed for this visit  Physical Exam  Constitutional:       General: She is not in acute distress  Appearance: Normal appearance  She is well-developed  HENT:      Head: Normocephalic and atraumatic  Eyes:      Pupils: Pupils are equal, round, and reactive to light  Neck:      Musculoskeletal: Normal range of motion     Pulmonary:      Effort: Pulmonary effort is normal  Neurological:      Mental Status: She is alert and oriented to person, place, and time  Psychiatric:         Mood and Affect: Mood normal          Behavior: Behavior normal          Thought Content: Thought content normal          Judgment: Judgment normal        VIRTUAL VISIT DISCLAIMER    Robbi Abel acknowledges that she has consented to an online visit or consultation  She understands that the online visit is based solely on information provided by her, and that, in the absence of a face-to-face physical evaluation by the physician, the diagnosis she receives is both limited and provisional in terms of accuracy and completeness  This is not intended to replace a full medical face-to-face evaluation by the physician  Robbi Abel understands and accepts these terms

## 2021-05-19 NOTE — LETTER
May 19, 2021     Patient: Kodi Knight   YOB: 1990   Date of Visit: 5/19/2021       To Whom it May Concern:    Kodi Knight is under my professional care  She was evaluated via a virtual visit on 5/19/2021  She tested positive for COVID 19 on 5/17/2021  She may return to work on 5/26/2021  If you have any questions or concerns, please don't hesitate to call           Sincerely,          DANNY Manning        CC: No Recipients

## 2021-06-07 ENCOUNTER — IMMUNIZATIONS (OUTPATIENT)
Dept: FAMILY MEDICINE CLINIC | Facility: HOSPITAL | Age: 31
End: 2021-06-07

## 2021-06-07 DIAGNOSIS — Z23 ENCOUNTER FOR IMMUNIZATION: Primary | ICD-10-CM

## 2021-06-07 PROCEDURE — 91300 SARS-COV-2 / COVID-19 MRNA VACCINE (PFIZER-BIONTECH) 30 MCG: CPT

## 2021-06-07 PROCEDURE — 0002A SARS-COV-2 / COVID-19 MRNA VACCINE (PFIZER-BIONTECH) 30 MCG: CPT

## 2021-11-29 ENCOUNTER — OFFICE VISIT (OUTPATIENT)
Dept: INTERNAL MEDICINE CLINIC | Facility: CLINIC | Age: 31
End: 2021-11-29
Payer: COMMERCIAL

## 2021-11-29 VITALS
HEIGHT: 64 IN | WEIGHT: 185.8 LBS | BODY MASS INDEX: 31.72 KG/M2 | SYSTOLIC BLOOD PRESSURE: 116 MMHG | DIASTOLIC BLOOD PRESSURE: 74 MMHG | TEMPERATURE: 98.4 F | OXYGEN SATURATION: 98 % | RESPIRATION RATE: 18 BRPM | HEART RATE: 73 BPM

## 2021-11-29 DIAGNOSIS — J01.01 ACUTE RECURRENT MAXILLARY SINUSITIS: Primary | ICD-10-CM

## 2021-11-29 PROCEDURE — 1036F TOBACCO NON-USER: CPT | Performed by: INTERNAL MEDICINE

## 2021-11-29 PROCEDURE — 3725F SCREEN DEPRESSION PERFORMED: CPT | Performed by: INTERNAL MEDICINE

## 2021-11-29 PROCEDURE — 99213 OFFICE O/P EST LOW 20 MIN: CPT | Performed by: INTERNAL MEDICINE

## 2021-11-29 PROCEDURE — 3008F BODY MASS INDEX DOCD: CPT | Performed by: INTERNAL MEDICINE

## 2021-11-29 RX ORDER — AMOXICILLIN AND CLAVULANATE POTASSIUM 875; 125 MG/1; MG/1
1 TABLET, FILM COATED ORAL EVERY 12 HOURS SCHEDULED
Qty: 20 TABLET | Refills: 0 | Status: SHIPPED | OUTPATIENT
Start: 2021-11-29 | End: 2021-12-09

## 2021-12-03 ENCOUNTER — TELEPHONE (OUTPATIENT)
Dept: INTERNAL MEDICINE CLINIC | Facility: CLINIC | Age: 31
End: 2021-12-03

## 2021-12-14 ENCOUNTER — IMMUNIZATIONS (OUTPATIENT)
Dept: FAMILY MEDICINE CLINIC | Facility: HOSPITAL | Age: 31
End: 2021-12-14

## 2021-12-14 DIAGNOSIS — Z23 ENCOUNTER FOR IMMUNIZATION: Primary | ICD-10-CM

## 2021-12-14 PROCEDURE — 91300 COVID-19 PFIZER VACC 0.3 ML: CPT

## 2021-12-14 PROCEDURE — 0001A COVID-19 PFIZER VACC 0.3 ML: CPT

## 2022-12-07 ENCOUNTER — OFFICE VISIT (OUTPATIENT)
Dept: URGENT CARE | Facility: CLINIC | Age: 32
End: 2022-12-07

## 2022-12-07 VITALS
RESPIRATION RATE: 18 BRPM | TEMPERATURE: 98.3 F | SYSTOLIC BLOOD PRESSURE: 112 MMHG | DIASTOLIC BLOOD PRESSURE: 73 MMHG | HEART RATE: 68 BPM | OXYGEN SATURATION: 98 %

## 2022-12-07 DIAGNOSIS — J06.9 ACUTE URI: Primary | ICD-10-CM

## 2022-12-07 DIAGNOSIS — Z03.818 ENCNTR FOR OBS FOR SUSP EXPSR TO OTH BIOLG AGENTS RULED OUT: ICD-10-CM

## 2022-12-07 NOTE — LETTER
[address] [facility] [address] [facility] [address]  Dept: [phone]    December 7, 2022    Patient: John Salguero  YOB: 1990    John Salguero was seen and evaluated at our [facility]  Please note if Covid and Flu tests are negative, they may return to work when fever free for 24 hours without the use of a fever reducing agent  If Covid or Flu test is positive, they may return to work on 12/11/2022, as this is 5 days from the onset of symptoms  Upon return, they must then adhere to strict masking for an additional 5 days      Sincerely,        DANNY Mckeon

## 2022-12-07 NOTE — PROGRESS NOTES
[address] [facility] [facility]        NAME: Luh Nobles is a 32 y o  female  : 1990    MRN: [id number]  DATE: 2022  TIME: 11:47 AM    Assessment and Plan   Acute URI [J06 9]  1  Acute URI        2  Encntr for obs for susp expsr to oth biolg agents ruled out  Covid/Flu-Office Collect            Patient Instructions       Follow up with PCP in 3-5 days  Proceed to  ER if symptoms worsen  Patient Instructions   CoVid/influenza swab collected today  Please review results on [facility] My Chart and contact PCP for any positive result  Hydrate adequately  May take over the counter Sudafed or other sinus decongestant for nasal stuffiness  Instill saline nasal spray as needed to reduce congestion symptoms  Monitor for any shortness of breath, difficulty breathing and follow up in ER as needed  Follow up with PCP for any persistent or worsening symptoms  Chief Complaint     Chief Complaint   Patient presents with   • Cold Like Symptoms     Patient here with chest congestion, cough, mild shortness of breath and fatigue since Monday  Patient is concerned for sinus infection  Patient states her family has been sick  Patient's son was tested for covid, flu and strep which were all negative  History of Present Illness       Here today with c/o sudden onset nasal congestion, cough,  Chest tightness x 2 days  +sick contacts in home  Taking Nyquil for symptoms  Cough worse at night +pnd   +scratchy throat, no ear pain  No vomiting or diarrhea  Review of Systems   Review of Systems   Constitutional: Negative for fever  HENT: Positive for congestion and sinus pressure  Negative for ear pain, sneezing and sore throat  Eyes: Negative for discharge and redness  Respiratory: Positive for cough and chest tightness  Negative for shortness of breath  Cardiovascular: Negative for chest pain  Gastrointestinal: Negative for abdominal pain, diarrhea, nausea and vomiting  Genitourinary: Negative for decreased urine volume  Musculoskeletal: Negative for myalgias  Allergic/Immunologic: Negative for environmental allergies  Neurological: Positive for headaches  Negative for dizziness  Hematological: Negative for adenopathy  Psychiatric/Behavioral: Negative for sleep disturbance  Current Medications       Current Outpatient Medications:   •  Levothyroxine Sodium (SYNTHROID PO), Take 150 mg by mouth daily, Disp: , Rfl:   •  selenium 50 MCG TABS, Take 50 mcg by mouth daily, Disp: , Rfl:     Current Allergies     Allergies as of 12/07/2022   • (No Known Allergies)            The following portions of the patient's history were reviewed and updated as appropriate: allergies, current medications, past family history, past medical history, past social history, past surgical history and problem list      Past Medical History:   Diagnosis Date   • Migraine    • Normal delivery     2014 son   • Sciatica    • Thyroid disease    • Thyroid dysfunction in pregnancy, antepartum     last assessed 09/02/14   • Varicella        Past Surgical History:   Procedure Laterality Date   • TOOTH EXTRACTION         Family History   Problem Relation Age of Onset   • Hypertension Mother    • Anemia Family    • Arthritis Family    • Diabetes Family    • Hypertension Family    • Kidney disease Family    • Heart disease Family    • No Known Problems Father    • Arthritis Sister    • No Known Problems Brother    • No Known Problems Son    • No Known Problems Paternal Grandmother    • Kidney disease Maternal Uncle          Medications have been verified  Objective   /73   Pulse 68   Temp 98 3 °F (36 8 °C)   Resp 18   SpO2 98%   No LMP recorded  Physical Exam     Physical Exam  Vitals reviewed  Constitutional:       General: She is not in acute distress  Appearance: She is well-developed and well-groomed  She is not ill-appearing  HENT:      Head: Normocephalic  Right Ear: Tympanic membrane and ear canal normal       Left Ear: Tympanic membrane and ear canal normal       Nose: Congestion (mild) present  Mouth/Throat:      Lips: Pink  Mouth: Mucous membranes are moist       Pharynx: Oropharynx is clear  Eyes:      General: Lids are normal          Right eye: No discharge  Left eye: No discharge  Cardiovascular:      Rate and Rhythm: Regular rhythm  Heart sounds: Normal heart sounds, S1 normal and S2 normal    Pulmonary:      Effort: Pulmonary effort is normal  No respiratory distress  Breath sounds: Normal breath sounds and air entry  No decreased breath sounds, wheezing or rhonchi  Musculoskeletal:      Cervical back: Normal range of motion  Lymphadenopathy:      Cervical: No cervical adenopathy  Skin:     General: Skin is warm and dry  Neurological:      Mental Status: She is alert  Psychiatric:         Behavior: Behavior normal  Behavior is cooperative

## 2022-12-07 NOTE — PATIENT INSTRUCTIONS
CoVid/influenza swab collected today  Please review results on [facility] My Chart and contact PCP for any positive result  Hydrate adequately  May take over the counter Sudafed or other sinus decongestant for nasal stuffiness  Instill saline nasal spray as needed to reduce congestion symptoms  Monitor for any shortness of breath, difficulty breathing and follow up in ER as needed  Follow up with PCP for any persistent or worsening symptoms

## 2022-12-08 LAB
FLUAV RNA RESP QL NAA+PROBE: NEGATIVE
FLUBV RNA RESP QL NAA+PROBE: NEGATIVE
SARS-COV-2 RNA RESP QL NAA+PROBE: NEGATIVE

## 2023-01-16 ENCOUNTER — ANNUAL EXAM (OUTPATIENT)
Dept: OBGYN CLINIC | Facility: CLINIC | Age: 33
End: 2023-01-16

## 2023-01-16 VITALS — BODY MASS INDEX: 31.34 KG/M2 | SYSTOLIC BLOOD PRESSURE: 112 MMHG | DIASTOLIC BLOOD PRESSURE: 82 MMHG | WEIGHT: 182.6 LBS

## 2023-01-16 DIAGNOSIS — Z01.419 ENCOUNTER FOR ANNUAL ROUTINE GYNECOLOGICAL EXAMINATION: Primary | ICD-10-CM

## 2023-01-16 DIAGNOSIS — Z12.4 CERVICAL CANCER SCREENING: ICD-10-CM

## 2023-01-16 PROBLEM — E55.9 VITAMIN D DEFICIENCY: Status: ACTIVE | Noted: 2022-06-09

## 2023-01-16 NOTE — PROGRESS NOTES
Patient presents for a routine annual visit  Menarche- 12Y/O  Last Pap Smear-  1/4/18 neg  Collect today  LMP- 1/6/23  Birth control-none  [id number]  Non smoker  Social drinker  Currently sexually active  No family history of uterine, ovarian, cervical or breast cancer  Reports her periods are changing  Her periods are heavy and crampy the first couple of days and then spots for about 5 days after that  She does not usually spot for so long

## 2023-01-16 NOTE — ASSESSMENT & PLAN NOTE
Pap/HPV collected    Encourage healthy diet, exercise, Calcium 1200mg per day and at least 800 iu Vitamin D daily

## 2023-01-16 NOTE — PROGRESS NOTES
Assessment/Plan:    Encounter for annual routine gynecological examination  Pap/HPV collected    Encourage healthy diet, exercise, Calcium 1200mg per day and at least 800 iu Vitamin D daily  Diagnoses and all orders for this visit:    Encounter for annual routine gynecological examination    Cervical cancer screening  -     Liquid-based pap, screening    Other orders  -     BIOTIN PO; Take by mouth  -     COLLAGEN PO; Use          Subjective:      Patient ID: Sreedhar Rubio is a 28 y o  female  Patient presents for a routine annual visit  Menarche- 10Y/O  Last Pap Smear-  1/4/18 neg  Collect today  LMP- 1/6/23  Birth control-partner vasectomy  [id number]  Non smoker  Social drinker  Currently sexually active  No family history of uterine, ovarian, cervical or breast cancer  Reports her periods are changing  Her periods are heavy and crampy the first couple of days and then spots for about 5 days after that  She does not usually spot for so long  Gynecologic Exam  She reports no genital itching, genital lesions, genital odor, genital rash, pelvic pain, vaginal bleeding or vaginal discharge  Pertinent negatives include no chills, constipation, diarrhea, fever, nausea, sore throat or vomiting  She is sexually active  The following portions of the patient's history were reviewed and updated as appropriate:   She  has a past medical history of Migraine, Normal delivery, Sciatica, Thyroid disease, Thyroid dysfunction in pregnancy, antepartum, and Varicella  She   Patient Active Problem List    Diagnosis Date Noted   • Encounter for annual routine gynecological examination 01/16/2023   • Vitamin D deficiency 06/09/2022   • Hyperlipidemia, mixed 07/16/2020   • Obesity (BMI 30-39 9) 06/04/2020   • Refusal of blood transfusions as patient is Faith 04/13/2018   • Acquired hypothyroidism 03/10/2014     She  has a past surgical history that includes Tooth extraction    Her family history includes Anemia in her family; Arthritis in her family and sister; Diabetes in her family; Heart disease in her family; Hypertension in her family and mother; Kidney disease in her family and maternal uncle; No Known Problems in her brother, father, paternal grandmother, and son  She  reports that she has never smoked  She has never used smokeless tobacco  She reports current alcohol use  She reports that she does not use drugs  Current Outpatient Medications   Medication Sig Dispense Refill   • BIOTIN PO Take by mouth     • COLLAGEN PO Use     • Levothyroxine Sodium (SYNTHROID PO) Take 150 mg by mouth daily     • selenium 50 MCG TABS Take 50 mcg by mouth daily       No current facility-administered medications for this visit  Current Outpatient Medications on File Prior to Visit   Medication Sig   • BIOTIN PO Take by mouth   • COLLAGEN PO Use   • Levothyroxine Sodium (SYNTHROID PO) Take 150 mg by mouth daily   • selenium 50 MCG TABS Take 50 mcg by mouth daily     No current facility-administered medications on file prior to visit  She has No Known Allergies       Review of Systems   Constitutional: Negative for activity change, appetite change, chills, fatigue and fever  HENT: Negative for rhinorrhea, sneezing and sore throat  Eyes: Negative for visual disturbance  Respiratory: Negative for cough, shortness of breath and wheezing  Cardiovascular: Negative for chest pain, palpitations and leg swelling  Gastrointestinal: Negative for abdominal distention, constipation, diarrhea, nausea and vomiting  Genitourinary: Negative for difficulty urinating, pelvic pain and vaginal discharge  Neurological: Negative for syncope and light-headedness           Objective:      /82 (BP Location: Left arm, Patient Position: Sitting, Cuff Size: Standard)   Wt 82 8 kg (182 lb 9 6 oz)   LMP 01/06/2023 (Exact Date)   BMI 31 34 kg/m²          Physical Exam  Chest:   Breasts:     Breasts are symmetrical       Right: No inverted nipple, mass, nipple discharge, skin change or tenderness  Left: No inverted nipple, mass, nipple discharge, skin change or tenderness  Genitourinary:     Labia:         Right: No rash, tenderness, lesion or injury  Left: No rash, tenderness, lesion or injury  Vagina: Normal  No vaginal discharge, tenderness or bleeding  Cervix: No cervical motion tenderness, discharge or friability  Uterus: Not deviated, not enlarged, not fixed and not tender  Adnexa:         Right: No mass, tenderness or fullness  Left: No mass, tenderness or fullness  Neurological:      Mental Status: She is alert and oriented to person, place, and time

## 2023-01-18 LAB
HPV HR 12 DNA CVX QL NAA+PROBE: NEGATIVE
HPV16 DNA CVX QL NAA+PROBE: NEGATIVE
HPV18 DNA CVX QL NAA+PROBE: NEGATIVE

## 2023-01-24 LAB
LAB AP GYN PRIMARY INTERPRETATION: NORMAL
Lab: NORMAL

## 2023-02-01 ENCOUNTER — TELEPHONE (OUTPATIENT)
Dept: OBGYN CLINIC | Facility: CLINIC | Age: 33
End: 2023-02-01

## 2023-02-01 NOTE — TELEPHONE ENCOUNTER
Called and spoke with patient regarding her pap with hpv results  Aware of normal result  No questions

## 2023-04-25 ENCOUNTER — OFFICE VISIT (OUTPATIENT)
Dept: URGENT CARE | Facility: CLINIC | Age: 33
End: 2023-04-25

## 2023-04-25 VITALS
WEIGHT: 185 LBS | HEART RATE: 83 BPM | DIASTOLIC BLOOD PRESSURE: 79 MMHG | BODY MASS INDEX: 31.76 KG/M2 | SYSTOLIC BLOOD PRESSURE: 114 MMHG | RESPIRATION RATE: 20 BRPM | TEMPERATURE: 97.5 F | OXYGEN SATURATION: 99 %

## 2023-04-25 DIAGNOSIS — B96.89 ACUTE BACTERIAL RHINOSINUSITIS: Primary | ICD-10-CM

## 2023-04-25 DIAGNOSIS — J01.90 ACUTE BACTERIAL RHINOSINUSITIS: Primary | ICD-10-CM

## 2023-04-25 RX ORDER — AMOXICILLIN AND CLAVULANATE POTASSIUM 875; 125 MG/1; MG/1
1 TABLET, FILM COATED ORAL EVERY 12 HOURS SCHEDULED
Qty: 14 TABLET | Refills: 0 | Status: SHIPPED | OUTPATIENT
Start: 2023-04-25 | End: 2023-05-02

## 2023-04-25 RX ORDER — FLUCONAZOLE 150 MG/1
150 TABLET ORAL ONCE
Qty: 1 TABLET | Refills: 0 | Status: SHIPPED | OUTPATIENT
Start: 2023-04-25 | End: 2023-04-25

## 2023-04-25 RX ORDER — FLUTICASONE PROPIONATE 50 MCG
1 SPRAY, SUSPENSION (ML) NASAL DAILY
Qty: 9.9 ML | Refills: 0 | Status: SHIPPED | OUTPATIENT
Start: 2023-04-25

## 2023-04-25 NOTE — PROGRESS NOTES
[facility]        NAME: Marbella Thomas is a 28 y o  female  : 1990    MRN: [id number]  DATE: 2023  TIME: 9:27 AM    Assessment and Plan   Acute bacterial rhinosinusitis [J01 90, B96 89]  1  Acute bacterial rhinosinusitis  amoxicillin-clavulanate (AUGMENTIN) 875-125 mg per tablet    fluconazole (DIFLUCAN) 150 mg tablet    fluticasone (FLONASE) 50 mcg/act nasal spray        COVID/flu testing deferred as symptoms have been ongoing for 1 week  Patient Instructions     Take medications as directed for next 7 days  Take Augmentin with food to avoid upset stomach  Complete entire course of antibiotics even if feeling better  May continue over-the-counter products for symptoms: flonase with nasal saline for congestion, ibuprofen for body aches, tylenol for fever, and airborne/emergen-c for vitamin supplementation  Follow-up with PCP in 3-5 days if no improvement of symptoms  Report to ER if symptoms worsen  Chief Complaint     Chief Complaint   Patient presents with   • Cold Like Symptoms     Pt c/o loss of voice, SOB, sore throat, hurts to swallow, chest/nasal congestion, productive cough in morning but at night its a dry cough for over a week  History of Present Illness       28year old female presents for evaluation of cough, congestion, and sore throat for the past week  Reports family members at home have been sick  She has been using over-the-counter for symptoms without relief  Sinusitis  This is a new problem  The current episode started in the past 7 days  The problem is unchanged  There has been no fever  Her pain is at a severity of 8/10  The pain is moderate  Associated symptoms include congestion, coughing, headaches, a hoarse voice, sinus pressure, a sore throat and swollen glands  Pertinent negatives include no chills, diaphoresis, ear pain, neck pain, shortness of breath or sneezing  Past treatments include oral decongestants   The treatment provided no relief  Review of Systems   Review of Systems   Constitutional: Positive for activity change and fatigue  Negative for appetite change, chills, diaphoresis and fever  HENT: Positive for congestion, hoarse voice, postnasal drip, rhinorrhea, sinus pressure and sore throat  Negative for ear pain, sinus pain, sneezing and trouble swallowing  Respiratory: Positive for cough  Negative for chest tightness and shortness of breath  Cardiovascular: Negative for chest pain and palpitations  Gastrointestinal: Negative for abdominal pain, constipation, diarrhea, nausea and vomiting  Musculoskeletal: Negative for arthralgias, back pain, myalgias and neck pain  Neurological: Positive for headaches  Negative for dizziness and light-headedness           Current Medications       Current Outpatient Medications:   •  amoxicillin-clavulanate (AUGMENTIN) 875-125 mg per tablet, Take 1 tablet by mouth every 12 (twelve) hours for 7 days, Disp: 14 tablet, Rfl: 0  •  BIOTIN PO, Take by mouth, Disp: , Rfl:   •  COLLAGEN PO, Use, Disp: , Rfl:   •  fluconazole (DIFLUCAN) 150 mg tablet, Take 1 tablet (150 mg total) by mouth once for 1 dose, Disp: 1 tablet, Rfl: 0  •  fluticasone (FLONASE) 50 mcg/act nasal spray, 1 spray into each nostril daily, Disp: 9 9 mL, Rfl: 0  •  Levothyroxine Sodium (SYNTHROID PO), Take 150 mg by mouth daily, Disp: , Rfl:   •  selenium 50 MCG TABS, Take 50 mcg by mouth daily, Disp: , Rfl:     Current Allergies     Allergies as of 04/25/2023   • (No Known Allergies)            The following portions of the patient's history were reviewed and updated as appropriate: allergies, current medications, past family history, past medical history, past social history, past surgical history and problem list      Past Medical History:   Diagnosis Date   • Migraine    • Normal delivery     2014 son   • Sciatica    • Thyroid disease    • Thyroid dysfunction in pregnancy, antepartum     last assessed 09/02/14   • Varicella        Past Surgical History:   Procedure Laterality Date   • TOOTH EXTRACTION         Family History   Problem Relation Age of Onset   • Hypertension Mother    • No Known Problems Father    • Arthritis Sister    • No Known Problems Brother    • No Known Problems Paternal Grandmother    • No Known Problems Son    • Kidney disease Maternal Uncle    • Anemia Family    • Arthritis Family    • Diabetes Family    • Hypertension Family    • Kidney disease Family    • Heart disease Family    • Breast cancer Neg Hx    • Ovarian cancer Neg Hx    • Uterine cancer Neg Hx    • Cervical cancer Neg Hx    • Colon cancer Neg Hx          Medications have been verified  Objective   /79   Pulse 83   Temp 97 5 °F (36 4 °C)   Resp 20   Wt 83 9 kg (185 lb)   LMP 03/25/2023 (Approximate)   SpO2 99%   BMI 31 76 kg/m²        Physical Exam     Physical Exam  Vitals and nursing note reviewed  Constitutional:       General: She is awake  Appearance: Normal appearance  She is well-developed and normal weight  HENT:      Head: Normocephalic and atraumatic  Right Ear: Hearing, ear canal and external ear normal  A middle ear effusion is present  Tympanic membrane is not perforated, erythematous, retracted or bulging  Left Ear: Hearing, ear canal and external ear normal  A middle ear effusion is present  Tympanic membrane is erythematous  Tympanic membrane is not perforated, retracted or bulging  Nose: Congestion and rhinorrhea present  Rhinorrhea is clear  Right Turbinates: Enlarged  Not swollen or pale  Left Turbinates: Enlarged  Not swollen or pale  Right Sinus: No maxillary sinus tenderness or frontal sinus tenderness  Left Sinus: No maxillary sinus tenderness or frontal sinus tenderness  Mouth/Throat:      Lips: Pink  Mouth: Mucous membranes are moist       Pharynx: Oropharynx is clear  Uvula midline  Tonsils: No tonsillar exudate or tonsillar abscesses  2+ on the right  2+ on the left  Eyes:      Extraocular Movements: Extraocular movements intact  Conjunctiva/sclera: Conjunctivae normal       Pupils: Pupils are equal, round, and reactive to light  Cardiovascular:      Rate and Rhythm: Normal rate and regular rhythm  Pulses: Normal pulses  Heart sounds: Normal heart sounds  Pulmonary:      Effort: Pulmonary effort is normal       Breath sounds: Normal breath sounds  Musculoskeletal:      Cervical back: Full passive range of motion without pain, normal range of motion and neck supple  Lymphadenopathy:      Cervical: Cervical adenopathy present  Skin:     General: Skin is warm and dry  Neurological:      General: No focal deficit present  Mental Status: She is alert and oriented to person, place, and time  Psychiatric:         Mood and Affect: Mood normal          Behavior: Behavior normal  Behavior is cooperative  Thought Content:  Thought content normal          Judgment: Judgment normal

## 2023-05-08 ENCOUNTER — OFFICE VISIT (OUTPATIENT)
Dept: URGENT CARE | Facility: CLINIC | Age: 33
End: 2023-05-08

## 2023-05-08 VITALS
WEIGHT: 186 LBS | DIASTOLIC BLOOD PRESSURE: 75 MMHG | RESPIRATION RATE: 20 BRPM | BODY MASS INDEX: 31.93 KG/M2 | OXYGEN SATURATION: 99 % | HEART RATE: 60 BPM | TEMPERATURE: 97.8 F | SYSTOLIC BLOOD PRESSURE: 107 MMHG

## 2023-05-08 DIAGNOSIS — R05.1 ACUTE COUGH: Primary | ICD-10-CM

## 2023-05-08 RX ORDER — FLUTICASONE PROPIONATE 50 MCG
1 SPRAY, SUSPENSION (ML) NASAL 2 TIMES DAILY
Qty: 11.1 ML | Refills: 0 | Status: SHIPPED | OUTPATIENT
Start: 2023-05-08

## 2023-05-08 RX ORDER — ALBUTEROL SULFATE 90 UG/1
2 AEROSOL, METERED RESPIRATORY (INHALATION) EVERY 6 HOURS PRN
Qty: 8.5 G | Refills: 0 | Status: SHIPPED | OUTPATIENT
Start: 2023-05-08

## 2023-05-08 RX ORDER — PSEUDOEPHEDRINE HCL 120 MG/1
120 TABLET, FILM COATED, EXTENDED RELEASE ORAL 2 TIMES DAILY
Qty: 10 TABLET | Refills: 0 | Status: SHIPPED | OUTPATIENT
Start: 2023-05-08 | End: 2023-05-11

## 2023-05-08 RX ORDER — CETIRIZINE HYDROCHLORIDE 10 MG/1
10 TABLET ORAL DAILY
Qty: 10 TABLET | Refills: 0 | Status: SHIPPED | OUTPATIENT
Start: 2023-05-08 | End: 2023-05-18

## 2023-05-08 NOTE — PROGRESS NOTES
[facility]        NAME: Tera Gomez is a 28 y o  female  : 1990    MRN: [id number]  DATE: May 8, 2023  TIME: 9:08 AM    Assessment and Orders   Acute cough [R05 1]  1  Acute cough  fluticasone (FLONASE) 50 mcg/act nasal spray    cetirizine (ZyrTEC) 10 mg tablet    pseudoephedrine (SUDAFED) 120 MG 12 hr tablet    albuterol (ProAir HFA) 90 mcg/act inhaler            Plan and Discussion      Symptoms and exam consistent with acute cough likely secondary to postnasal drip from seasonal allergies  Will treat with antihistamine, Sudafed, Flonase  Patient also prescribed albuterol inhaler should she develop wheezing  No wheezing appreciated on exam today  Discussed ED precautions including (but not limited to)  • Difficultly breathing or shortness of breath  • Chest pain  • Acutely worsening symptoms  Risks and benefits discussed  Patient understands and agrees with the plan  Follow up with PCP  Chief Complaint     Chief Complaint   Patient presents with   • Cold Like Symptoms     Pt states last Friday she lost her voice and began to develop a dry cough and chest congestion  Pt just finished antibiotics last Wednesday with some relief on day 2 and last Friday symptoms worsened  History of Present Illness       Recently finished a course of Augmentin however continues to have postnasal drip and dry cough  Stopped using Flonase a few days ago because she did not have any more nasal congestion  Cough  This is a new problem  The current episode started 1 to 4 weeks ago  The cough is non-productive  Associated symptoms include postnasal drip and wheezing  Pertinent negatives include no fever or sore throat  There is no history of asthma or COPD  Review of Systems   Review of Systems   Constitutional: Negative for fever  HENT: Positive for postnasal drip  Negative for sore throat  Respiratory: Positive for cough and wheezing            Current Medications Current Outpatient Medications:   •  albuterol (ProAir HFA) 90 mcg/act inhaler, Inhale 2 puffs every 6 (six) hours as needed for wheezing, Disp: 8 5 g, Rfl: 0  •  BIOTIN PO, Take by mouth, Disp: , Rfl:   •  cetirizine (ZyrTEC) 10 mg tablet, Take 1 tablet (10 mg total) by mouth daily for 10 days, Disp: 10 tablet, Rfl: 0  •  COLLAGEN PO, Use, Disp: , Rfl:   •  fluticasone (FLONASE) 50 mcg/act nasal spray, 1 spray into each nostril daily, Disp: 9 9 mL, Rfl: 0  •  fluticasone (FLONASE) 50 mcg/act nasal spray, 1 spray into each nostril 2 (two) times a day, Disp: 11 1 mL, Rfl: 0  •  Levothyroxine Sodium (SYNTHROID PO), Take 150 mg by mouth daily, Disp: , Rfl:   •  pseudoephedrine (SUDAFED) 120 MG 12 hr tablet, Take 1 tablet (120 mg total) by mouth 2 (two) times a day, Disp: 10 tablet, Rfl: 0  •  selenium 50 MCG TABS, Take 50 mcg by mouth daily, Disp: , Rfl:     Current Allergies     Allergies as of 05/08/2023   • (No Known Allergies)            The following portions of the patient's history were reviewed and updated as appropriate: allergies, current medications, past family history, past medical history, past social history, past surgical history and problem list      Past Medical History:   Diagnosis Date   • Migraine    • Normal delivery     2014 son   • Sciatica    • Thyroid disease    • Thyroid dysfunction in pregnancy, antepartum     last assessed 09/02/14   • Varicella        Past Surgical History:   Procedure Laterality Date   • TOOTH EXTRACTION         Family History   Problem Relation Age of Onset   • Hypertension Mother    • No Known Problems Father    • Arthritis Sister    • No Known Problems Brother    • No Known Problems Paternal Grandmother    • No Known Problems Son    • Kidney disease Maternal Uncle    • Anemia Family    • Arthritis Family    • Diabetes Family    • Hypertension Family    • Kidney disease Family    • Heart disease Family    • Breast cancer Neg Hx    • Ovarian cancer Neg Hx    • Uterine cancer Neg Hx    • Cervical cancer Neg Hx    • Colon cancer Neg Hx          Medications have been verified  Objective   /75   Pulse 60   Temp 97 8 °F (36 6 °C)   Resp 20   Wt 84 4 kg (186 lb)   SpO2 99%   BMI 31 93 kg/m²   No LMP recorded  Physical Exam     Physical Exam  Constitutional:       General: She is not in acute distress  Appearance: She is not ill-appearing or toxic-appearing  HENT:      Head: Normocephalic and atraumatic  Right Ear: Tympanic membrane and external ear normal       Left Ear: Tympanic membrane and external ear normal       Nose: Congestion present  Comments: Boggy nasal turbinates     Mouth/Throat:      Pharynx: Posterior oropharyngeal erythema present  Comments: Cobblestone appearance in posterior pharynx  Cardiovascular:      Rate and Rhythm: Normal rate and regular rhythm  Pulmonary:      Effort: Pulmonary effort is normal  No respiratory distress  Breath sounds: No wheezing  Neurological:      General: No focal deficit present  Mental Status: She is alert and oriented to person, place, and time  Psychiatric:         Mood and Affect: Mood normal          Behavior: Behavior normal          Thought Content:  Thought content normal          Judgment: Judgment normal                Flavio Gallegos DO

## 2023-05-11 ENCOUNTER — OFFICE VISIT (OUTPATIENT)
Dept: URGENT CARE | Facility: CLINIC | Age: 33
End: 2023-05-11

## 2023-05-11 VITALS
SYSTOLIC BLOOD PRESSURE: 132 MMHG | WEIGHT: 183.8 LBS | HEART RATE: 90 BPM | TEMPERATURE: 99.5 F | RESPIRATION RATE: 18 BRPM | DIASTOLIC BLOOD PRESSURE: 87 MMHG | OXYGEN SATURATION: 100 % | BODY MASS INDEX: 31.55 KG/M2

## 2023-05-11 DIAGNOSIS — R05.1 ACUTE COUGH: Primary | ICD-10-CM

## 2023-05-11 DIAGNOSIS — J02.9 ACUTE PHARYNGITIS, UNSPECIFIED ETIOLOGY: ICD-10-CM

## 2023-05-11 LAB
S PYO AG THROAT QL: NEGATIVE
SARS-COV-2 AG UPPER RESP QL IA: NEGATIVE
VALID CONTROL: NORMAL

## 2023-05-11 RX ORDER — PREDNISONE 20 MG/1
TABLET ORAL
Qty: 30 TABLET | Refills: 0 | Status: SHIPPED | OUTPATIENT
Start: 2023-05-11

## 2023-05-11 NOTE — PROGRESS NOTES
[facility]        NAME: Leticia Serna is a 28 y o  female  : 1990    MRN: [id number]  DATE: May 11, 2023  TIME: 10:20 AM    Assessment and Plan   Acute cough [R05 1]  1  Acute cough  Poct Covid 19 Rapid Antigen Test    predniSONE 20 mg tablet      2  Acute pharyngitis, unspecified etiology  POCT rapid strepA    predniSONE 20 mg tablet            Patient Instructions       Follow up with PCP in 3-5 days  Proceed to  ER if symptoms worsen  Chief Complaint     Chief Complaint   Patient presents with   • Cold Like Symptoms     Patient reported that her symptoms keep returning  Patient reported Tuesday evening she developed bodyaches, chills and RT ear pressure  Patient reported that her throat also feels sore and swollen  Patient reported taking Flonase, Allergy medicine and two rounds of abx  Pt said she was given a prescription for Sudafed, the pharmacy was out of the medication but she did not try OTC  Pt reported she's been trying Nightquil  History of Present Illness       URI   This is a new problem  The current episode started 1 to 4 weeks ago  The problem has been gradually worsening  There has been no fever  Associated symptoms include congestion, coughing, ear pain, headaches, rhinorrhea, sneezing and a sore throat  Pertinent negatives include no abdominal pain, chest pain, nausea, rash, vomiting or wheezing  She has tried antihistamine, acetaminophen and NSAIDs for the symptoms  The treatment provided no relief  Review of Systems   Review of Systems   Constitutional: Positive for activity change, appetite change, chills, fatigue and fever  HENT: Positive for congestion, ear pain, rhinorrhea, sneezing and sore throat  Eyes: Negative for visual disturbance  Respiratory: Positive for cough  Negative for chest tightness, shortness of breath and wheezing  Cardiovascular: Negative for chest pain and palpitations     Gastrointestinal: Negative for abdominal pain, nausea and vomiting  Skin: Negative for rash  Neurological: Positive for headaches  Negative for dizziness and light-headedness  Current Medications       Current Outpatient Medications:   •  albuterol (ProAir HFA) 90 mcg/act inhaler, Inhale 2 puffs every 6 (six) hours as needed for wheezing, Disp: 8 5 g, Rfl: 0  •  BIOTIN PO, Take by mouth, Disp: , Rfl:   •  cetirizine (ZyrTEC) 10 mg tablet, Take 1 tablet (10 mg total) by mouth daily for 10 days, Disp: 10 tablet, Rfl: 0  •  COLLAGEN PO, Use, Disp: , Rfl:   •  fluticasone (FLONASE) 50 mcg/act nasal spray, 1 spray into each nostril 2 (two) times a day, Disp: 11 1 mL, Rfl: 0  •  Levothyroxine Sodium (SYNTHROID PO), Take 150 mg by mouth daily, Disp: , Rfl:   •  predniSONE 20 mg tablet, 3 tabs my mouth x 5 days, then 2 tabs my mouth x 5 days, then 1 tab my mouth x 5 days  , Disp: 30 tablet, Rfl: 0  •  selenium 50 MCG TABS, Take 50 mcg by mouth daily, Disp: , Rfl:     Current Allergies     Allergies as of 05/11/2023   • (No Known Allergies)            The following portions of the patient's history were reviewed and updated as appropriate: allergies, current medications, past family history, past medical history, past social history, past surgical history and problem list      Past Medical History:   Diagnosis Date   • Migraine    • Normal delivery     2014 son   • Sciatica    • Thyroid disease    • Thyroid dysfunction in pregnancy, antepartum     last assessed 09/02/14   • Varicella        Past Surgical History:   Procedure Laterality Date   • TOOTH EXTRACTION         Family History   Problem Relation Age of Onset   • Hypertension Mother    • No Known Problems Father    • Arthritis Sister    • No Known Problems Brother    • No Known Problems Paternal Grandmother    • No Known Problems Son    • Kidney disease Maternal Uncle    • Anemia Family    • Arthritis Family    • Diabetes Family    • Hypertension Family    • Kidney disease Family    • Heart disease Family    • Breast cancer Neg Hx    • Ovarian cancer Neg Hx    • Uterine cancer Neg Hx    • Cervical cancer Neg Hx    • Colon cancer Neg Hx          Medications have been verified  Objective   /87   Pulse 90   Temp 99 5 °F (37 5 °C) (Tympanic)   Resp 18   Wt 83 4 kg (183 lb 12 8 oz)   SpO2 100%   BMI 31 55 kg/m²        Physical Exam     Physical Exam  Vitals and nursing note reviewed  Constitutional:       General: She is not in acute distress  Appearance: Normal appearance  She is well-developed  She is not ill-appearing  HENT:      Head: Normocephalic and atraumatic  Right Ear: Tympanic membrane, ear canal and external ear normal       Left Ear: Tympanic membrane, ear canal and external ear normal       Nose: Congestion and rhinorrhea present  Mouth/Throat:      Mouth: Mucous membranes are moist  No oral lesions  Pharynx: Oropharyngeal exudate and posterior oropharyngeal erythema present  Eyes:      General: No scleral icterus  Extraocular Movements: Extraocular movements intact  Right eye: Normal extraocular motion  Left eye: Normal extraocular motion  Conjunctiva/sclera: Conjunctivae normal       Pupils: Pupils are equal, round, and reactive to light  Cardiovascular:      Rate and Rhythm: Normal rate and regular rhythm  Pulses: Normal pulses  Heart sounds: Normal heart sounds  Pulmonary:      Effort: Pulmonary effort is normal  No respiratory distress  Breath sounds: Normal breath sounds  No wheezing or rhonchi  Abdominal:      General: Bowel sounds are normal       Palpations: Abdomen is soft  There is no mass  Tenderness: There is no abdominal tenderness  There is no guarding or rebound  Musculoskeletal:         General: Normal range of motion  Cervical back: Normal range of motion  Tenderness present  Lymphadenopathy:      Cervical: Cervical adenopathy present  Skin:     General: Skin is warm and dry  Capillary Refill: Capillary refill takes less than 2 seconds  Findings: No lesion or rash  Neurological:      General: No focal deficit present  Mental Status: She is alert and oriented to person, place, and time  Coordination: Coordination normal       Gait: Gait normal    Psychiatric:         Mood and Affect: Mood normal          Behavior: Behavior normal          Thought Content:  Thought content normal          Judgment: Judgment normal

## 2023-05-11 NOTE — PATIENT INSTRUCTIONS
Mononucleosis   AMBULATORY CARE:   Mononucleosis (mono)  is an infection caused by a virus  Mono is spread through saliva  Common symptoms include the following:   Extreme tiredness or weakness     Fever    Headache and muscle aches    Sore throat or swollen tonsils    Tender, swollen lymph nodes on the sides and back of your neck    Night sweats    Loss of appetite    Call 911 for any of the following: You have shortness of breath  You are confused or have a seizure  Seek care immediately if:   You have severe pain in your abdomen or shoulder  You have trouble swallowing because of the pain  You urinate very little or not at all  Your arms or legs are weak  Contact your healthcare provider if:   Your symptoms get worse, even after treatment  You have questions or concerns about your condition or care  Medicines:   Acetaminophen  decreases pain and fever  It is available without a doctor's order  Ask how much to take and how often to take it  Follow directions  Acetaminophen can cause liver damage if not taken correctly  NSAIDs , such as ibuprofen, help decrease swelling, pain, and fever  This medicine is available with or without a doctor's order  NSAIDs can cause stomach bleeding or kidney problems in certain people  If you take blood thinner medicine, always ask your healthcare provider if NSAIDs are safe for you  Always read the medicine label and follow directions  Steroids  help decrease inflammation  Antibiotics  may be needed if you also have a bacterial infection  Take your medicine as directed  Contact your healthcare provider if you think your medicine is not helping or if you have side effects  Tell your provider if you are allergic to any medicine  Keep a list of the medicines, vitamins, and herbs you take  Include the amounts, and when and why you take them  Bring the list or the pill bottles to follow-up visits   Carry your medicine list with you in case of an emergency  Self-care:   Rest as needed  Slowly start to do more each day as you feel better  Drink liquids as directed  Liquids will help prevent dehydration  Ask how much liquid to drink each day and which liquids are best for you  Do not play sports or exercise for 3 to 4 weeks or as directed  When you return for your follow-up visit, your healthcare provider will tell you if you are able to return to full activity  Prevent the spread of mono:  Do not share food or drinks  Do not kiss anyone  The virus may be in your saliva for several months after you feel better  Wash your hands often  Use soap and water  Wash your hands after you use the bathroom, change a child's diapers, or sneeze  Wash your hands before you prepare or eat food  Follow up with your healthcare provider in 3 to 4 weeks:  Write down your questions so you remember to ask them during your visits  © Copyright Angela [employer]zy 2022 Information is for End User's use only and may not be sold, redistributed or otherwise used for commercial purposes  The above information is an  only  It is not intended as medical advice for individual conditions or treatments  Talk to your doctor, nurse or pharmacist before following any medical regimen to see if it is safe and effective for you  Pharyngitis   WHAT YOU NEED TO KNOW:   Pharyngitis, or sore throat, is inflammation of the tissues and structures in your pharynx (throat)  Pharyngitis is most often caused by bacteria or a virus  Other causes include smoking, allergies, or acid reflux  DISCHARGE INSTRUCTIONS:   Call your local emergency number (911 in the [address] [place] [address],[address]) if:   You have trouble breathing or swallowing because your throat is swollen  Return to the emergency department if:   You are drooling because it hurts too much to swallow  Your fever is higher than 102? F (39?C) or lasts longer than 3 days  You are confused  You taste blood      Call your doctor if: Your throat pain gets worse  You have a painful lump in your throat that does not go away after 5 days  Your symptoms do not improve after 5 days  You have questions or concerns about your condition or care  Medicines:  Viral pharyngitis will go away on its own without treatment  Your sore throat should start to feel better in 3 to 5 days  You may need any of the following:  Antibiotics  treat a bacterial infection  NSAIDs  help decrease swelling and pain or fever  This medicine is available with or without a doctor's order  NSAIDs can cause stomach bleeding or kidney problems in certain people  If you take blood thinner medicine, always ask your healthcare provider if NSAIDs are safe for you  Always read the medicine label and follow directions  Acetaminophen  decreases pain and fever  It is available without a doctor's order  Ask how much to take and how often to take it  Follow directions  Read the labels of all other medicines you are using to see if they also contain acetaminophen, or ask your doctor or pharmacist  Acetaminophen can cause liver damage if not taken correctly  Take your medicine as directed  Contact your healthcare provider if you think your medicine is not helping or if you have side effects  Tell your provider if you are allergic to any medicine  Keep a list of the medicines, vitamins, and herbs you take  Include the amounts, and when and why you take them  Bring the list or the pill bottles to follow-up visits  Carry your medicine list with you in case of an emergency  Manage your symptoms:   Gargle salt water  Mix ¼ teaspoon salt in an 8 ounce glass of warm water and gargle  Do not swallow  Salt water may help decrease swelling in your throat  Drink liquids as directed  You may need to drink more liquids than usual  Liquids may help soothe your throat and prevent dehydration  Ask how much liquid to drink each day and which liquids are best for you      Use a cool mist humidifier  This will add moisture to the air and help decrease your cough  Soothe your throat  Cough drops, ice, soft foods, or popsicles may help decrease throat pain  Prevent the spread of pharyngitis:  Cover your mouth and nose when you cough or sneeze  Do not share food or drinks  Wash your hands often  Use soap and water  If soap and water are unavailable, use an alcohol-based hand   Follow up with your doctor as directed:  Write down your questions so you remember to ask them during your visits  © Copyright Marc Obando 2022 Information is for End User's use only and may not be sold, redistributed or otherwise used for commercial purposes  The above information is an  only  It is not intended as medical advice for individual conditions or treatments  Talk to your doctor, nurse or pharmacist before following any medical regimen to see if it is safe and effective for you

## 2023-05-11 NOTE — LETTER
May 11, 2023      Patient: Jam Mcknight   YOB: 1990   Date of Visit: 5/11/2023       To Whom it May Concern:    Jam Mcknight was seen in my clinic on 5/11/2023  She may return to work on once fever free for 24 hours without the use of fever reducing medications       If you have any questions or concerns, please don't hesitate to call           Sincerely,          Filemon Peraza PA-C        CC: No Recipients

## 2023-05-12 ENCOUNTER — TELEPHONE (OUTPATIENT)
Dept: OTHER | Facility: OTHER | Age: 33
End: 2023-05-12

## 2023-05-12 ENCOUNTER — APPOINTMENT (OUTPATIENT)
Age: 33
End: 2023-05-12

## 2023-05-12 ENCOUNTER — OFFICE VISIT (OUTPATIENT)
Age: 33
End: 2023-05-12

## 2023-05-12 VITALS
HEIGHT: 64 IN | WEIGHT: 181 LBS | RESPIRATION RATE: 18 BRPM | BODY MASS INDEX: 30.9 KG/M2 | OXYGEN SATURATION: 100 % | SYSTOLIC BLOOD PRESSURE: 128 MMHG | HEART RATE: 75 BPM | DIASTOLIC BLOOD PRESSURE: 86 MMHG | TEMPERATURE: 97.7 F

## 2023-05-12 DIAGNOSIS — J02.9 PHARYNGITIS, UNSPECIFIED ETIOLOGY: ICD-10-CM

## 2023-05-12 DIAGNOSIS — J02.9 PHARYNGITIS, UNSPECIFIED ETIOLOGY: Primary | ICD-10-CM

## 2023-05-12 NOTE — TELEPHONE ENCOUNTER
Pt called in requesting an appt  She's been to [facility] several times in the last few weeks  She's taken antibiotics that are not helping her  She's been taking a steroid that helped a little bit, but she says she may have mono  She can be reached at  [phone]

## 2023-05-12 NOTE — PROGRESS NOTES
Name: Tera Gomez      : 1990      MRN: [id number]  Encounter Provider: DANNY Tee  Encounter Date: 2023   Encounter department: [address] [place] [address]     1  Pharyngitis, unspecified etiology  Presents with complaints of sore throat that started about 3 weeks ago; all other symptoms have resolved at this time  Took antibiotics for strep, no relief  Seen at urgent care 3 times since 3wks ago, notes reviewed, last visit yesterday was given steroids; labs reviewed and negative rapid and preliminary throat culture negative  Will wait for final throat culture result ?strep  Per exam, erythema noted on pharynx and some tonsillar exudate, with no swelling  High suspicion for Mono, EBV panel ordered  Discussed avoiding contact sports, states does not have any  Complaints of body aches, chills, and feeling warm about 2 days ago, has checked for COVID, negative, PCR sent  We will follow-up with patient as needed  -     EBV acute panel; Future  -     COVID Only- Office Collect     Subjective      Patient presents with complaints of sore throat  Patient states symptoms started 3 weeks ago, lost her voice, had nasal congestion, sore throat, runny nose, went to urgent care had Augmentin  States his symptoms got much better, then she lost her voice again, had chills, fatigue, went back to urgent care, states was given Flonase, Zyrtec, inhaler and Sudafed but no relief  Patient states she went back to urgent care yesterday, was given steroids states she has been feeling much better since then  Today the main complaint is sore throat, other symptoms seem to have resolved  Sore Throat   This is a recurrent problem  The current episode started 1 to 4 weeks ago  The problem has been gradually improving  Maximum temperature: not sure  Pertinent negatives include no abdominal pain, congestion, coughing, diarrhea, ear pain, shortness of breath or vomiting  "She has had exposure to strep  Exposure to: from her son  Review of Systems   Constitutional: Negative for chills and fever  HENT: Positive for sore throat  Negative for congestion and ear pain  Eyes: Negative for pain and visual disturbance  Respiratory: Negative for cough and shortness of breath  Cardiovascular: Negative for chest pain and palpitations  Gastrointestinal: Negative for abdominal pain, diarrhea and vomiting  Genitourinary: Negative for dysuria and hematuria  Musculoskeletal: Negative for arthralgias and back pain  Skin: Negative for color change and rash  Neurological: Negative for seizures and syncope  Psychiatric/Behavioral: Negative for sleep disturbance  All other systems reviewed and are negative  Current Outpatient Medications on File Prior to Visit   Medication Sig   • BIOTIN PO Take by mouth   • cetirizine (ZyrTEC) 10 mg tablet Take 1 tablet (10 mg total) by mouth daily for 10 days   • COLLAGEN PO Use   • fluticasone (FLONASE) 50 mcg/act nasal spray 1 spray into each nostril 2 (two) times a day   • Levothyroxine Sodium (SYNTHROID PO) Take 150 mg by mouth daily   • predniSONE 20 mg tablet 3 tabs my mouth x 5 days, then 2 tabs my mouth x 5 days, then 1 tab my mouth x 5 days  • selenium 50 MCG TABS Take 50 mcg by mouth daily   • albuterol (ProAir HFA) 90 mcg/act inhaler Inhale 2 puffs every 6 (six) hours as needed for wheezing (Patient not taking: Reported on 5/12/2023)       Objective     /86 (BP Location: Left arm, Patient Position: Sitting, Cuff Size: Standard)   Pulse 75   Temp 97 7 °F (36 5 °C) (Tympanic)   Resp 18   Ht 5' 4\" (1 626 m)   Wt 82 1 kg (181 lb)   SpO2 100%   BMI 31 07 kg/m²     Physical Exam  Vitals reviewed  Constitutional:       Appearance: Normal appearance  HENT:      Head: Normocephalic  Right Ear: Tympanic membrane normal       Left Ear: Tympanic membrane normal       Nose: Nose normal  No congestion or rhinorrhea   " Mouth/Throat:      Mouth: Mucous membranes are moist       Pharynx: Posterior oropharyngeal erythema present  Tonsils: Tonsillar exudate present  Cardiovascular:      Rate and Rhythm: Normal rate and regular rhythm  Pulses: Normal pulses  Heart sounds: Normal heart sounds  Pulmonary:      Effort: Pulmonary effort is normal  No respiratory distress  Breath sounds: Normal breath sounds  Abdominal:      General: Bowel sounds are normal       Palpations: There is no mass  Musculoskeletal:         General: Normal range of motion  Cervical back: Normal range of motion  Skin:     General: Skin is warm and dry  Neurological:      Mental Status: She is alert and oriented to person, place, and time  Psychiatric:         Mood and Affect: Mood normal          Behavior: Behavior normal          Thought Content:  Thought content normal          Judgment: Judgment normal        DANNY Becker

## 2023-05-12 NOTE — PATIENT INSTRUCTIONS
Start with salt gargles at least 3 x a day   Get blood work done  If you have trouble breathing, SOB, go to the ER

## 2023-05-13 LAB — BACTERIA THROAT CULT: NORMAL

## 2023-05-15 LAB
EBV NA IGG SER IA-ACNC: 160 U/ML (ref 0–17.9)
EBV VCA IGG SER IA-ACNC: 201 U/ML (ref 0–17.9)
EBV VCA IGM SER IA-ACNC: <36 U/ML (ref 0–35.9)
INTERPRETATION: ABNORMAL
SARS-COV-2 RNA RESP QL NAA+PROBE: NEGATIVE

## 2023-06-26 NOTE — TELEPHONE ENCOUNTER
No updated comm consent on file  vm box is full unable to leave a msg  Pt has not been seen here since 12/2018 for PP visit  Having heavy bleeding and wanted an appt to be seen  English

## 2023-11-05 NOTE — TELEPHONE ENCOUNTER
PT  ADDED TO COVID BOOK AND PLEASE ADVISE ON 2ND INJECTION Anxiety    Generalized anxiety disorder (JAVIER) is a mental disorder. It is defined as anxiety that is not necessarily related to specific events or activities or is out of proportion to said events. Symptoms include restlessness, fatigue, difficulty concentrations, irritability and difficulty concentrating. It interferes with life functions, including relationships, work, and school. If you were started on a medication make sure to take exactly as prescribed and follow up with a psychiatrist.    SEEK IMMEDIATE MEDICAL CARE IF YOU HAVE THE FOLLOWING SYMPTOMS: thoughts about hurting killing yourself, thoughts about hurting or killing somebody else, hallucinations, or worsening depression.

## 2024-01-29 ENCOUNTER — ANNUAL EXAM (OUTPATIENT)
Dept: OBGYN CLINIC | Facility: CLINIC | Age: 34
End: 2024-01-29
Payer: COMMERCIAL

## 2024-01-29 VITALS
BODY MASS INDEX: 29.19 KG/M2 | HEIGHT: 64 IN | DIASTOLIC BLOOD PRESSURE: 68 MMHG | WEIGHT: 171 LBS | SYSTOLIC BLOOD PRESSURE: 110 MMHG

## 2024-01-29 DIAGNOSIS — Z01.419 ENCOUNTER FOR ANNUAL ROUTINE GYNECOLOGICAL EXAMINATION: Primary | ICD-10-CM

## 2024-01-29 PROBLEM — E66.9 OBESITY (BMI 30-39.9): Status: RESOLVED | Noted: 2020-06-04 | Resolved: 2024-01-29

## 2024-01-29 PROCEDURE — 99395 PREV VISIT EST AGE 18-39: CPT | Performed by: OBSTETRICS & GYNECOLOGY

## 2024-01-29 RX ORDER — PHENTERMINE HYDROCHLORIDE 37.5 MG/1
37.5 TABLET ORAL
COMMUNITY
Start: 2023-12-12

## 2024-01-29 NOTE — PROGRESS NOTES
Assessment/Plan:    Encounter for annual routine gynecological examination  Pap/HPV current    Encourage healthy diet, exercise, Calcium 1200mg per day and at least 800 iu Vitamin D daily.       Diagnoses and all orders for this visit:    Encounter for annual routine gynecological examination    Other orders  -     Cholecalciferol 10 MCG (400 UNIT) CHEW; Chew  -     phentermine (ADIPEX-P) 37.5 MG tablet; Take 37.5 mg by mouth          Subjective:      Patient ID: Nel Herrera is a 33 y.o. female.    Patient presents for a routine annual visit  Menarche- 12Y/O  Last Pap Smear- 23 neg/neg  LMP-24  Birth control-none    Non smoker  Social drinker  Currently sexually active  No family history of uterine, ovarian, cervical or breast cancer    Eating healthy, exercising.  Started weight loss med.  Is now out of obese range    No concerns/questions for today's visit  Menses are regular.. Bleeding pattern - moderate for few days then spotting for up to 5.     Gynecologic Exam  She reports no genital itching, genital lesions, genital odor, genital rash, pelvic pain, vaginal bleeding or vaginal discharge. The patient is experiencing no pain. Pertinent negatives include no chills, constipation, diarrhea, fever, nausea, sore throat or vomiting.     The following portions of the patient's history were reviewed and updated as appropriate: She  has a past medical history of Migraine, Normal delivery, Sciatica, Thyroid disease, Thyroid dysfunction in pregnancy, antepartum, and Varicella.  She   Patient Active Problem List    Diagnosis Date Noted    Encounter for annual routine gynecological examination 2023    Vitamin D deficiency 2022    Hyperlipidemia, mixed 2020    Refusal of blood transfusions as patient is [employer] 2018    Acquired hypothyroidism 03/10/2014     She  has a past surgical history that includes Tooth extraction.  Her family history includes Anemia in her  family; Arthritis in her family and sister; Diabetes in her family; Heart disease in her family; Hypertension in her family and mother; Kidney disease in her family and maternal uncle; No Known Problems in her brother, father, paternal grandmother, and son.  She  reports that she has never smoked. She has never used smokeless tobacco. She reports current alcohol use. She reports that she does not use drugs.  Current Outpatient Medications   Medication Sig Dispense Refill    BIOTIN PO Take by mouth      Cholecalciferol 10 MCG (400 UNIT) CHEW Chew      COLLAGEN PO Use      Levothyroxine Sodium (SYNTHROID PO) Take 150 mg by mouth daily      phentermine (ADIPEX-P) 37.5 MG tablet Take 37.5 mg by mouth      selenium 50 MCG TABS Take 50 mcg by mouth daily      albuterol (ProAir HFA) 90 mcg/act inhaler Inhale 2 puffs every 6 (six) hours as needed for wheezing 8.5 g 0    cetirizine (ZyrTEC) 10 mg tablet Take 1 tablet (10 mg total) by mouth daily for 10 days (Patient not taking: Reported on 1/29/2024) 10 tablet 0    fluticasone (FLONASE) 50 mcg/act nasal spray 1 spray into each nostril 2 (two) times a day (Patient not taking: Reported on 1/29/2024) 11.1 mL 0    predniSONE 20 mg tablet 3 tabs my mouth x 5 days, then 2 tabs my mouth x 5 days, then 1 tab my mouth x 5 days. (Patient not taking: Reported on 1/29/2024) 30 tablet 0     No current facility-administered medications for this visit.     Current Outpatient Medications on File Prior to Visit   Medication Sig    BIOTIN PO Take by mouth    Cholecalciferol 10 MCG (400 UNIT) CHEW Chew    COLLAGEN PO Use    Levothyroxine Sodium (SYNTHROID PO) Take 150 mg by mouth daily    phentermine (ADIPEX-P) 37.5 MG tablet Take 37.5 mg by mouth    selenium 50 MCG TABS Take 50 mcg by mouth daily    albuterol (ProAir HFA) 90 mcg/act inhaler Inhale 2 puffs every 6 (six) hours as needed for wheezing    cetirizine (ZyrTEC) 10 mg tablet Take 1 tablet (10 mg total) by mouth daily for 10 days  "(Patient not taking: Reported on 1/29/2024)    fluticasone (FLONASE) 50 mcg/act nasal spray 1 spray into each nostril 2 (two) times a day (Patient not taking: Reported on 1/29/2024)    predniSONE 20 mg tablet 3 tabs my mouth x 5 days, then 2 tabs my mouth x 5 days, then 1 tab my mouth x 5 days. (Patient not taking: Reported on 1/29/2024)     No current facility-administered medications on file prior to visit.     She has No Known Allergies..    Review of Systems   Constitutional:  Negative for activity change, appetite change, chills, fatigue and fever.   HENT:  Negative for rhinorrhea, sneezing and sore throat.    Eyes:  Negative for visual disturbance.   Respiratory:  Negative for cough, shortness of breath and wheezing.    Cardiovascular:  Negative for chest pain, palpitations and leg swelling.   Gastrointestinal:  Negative for abdominal distention, constipation, diarrhea, nausea and vomiting.   Genitourinary:  Negative for difficulty urinating, pelvic pain and vaginal discharge.   Neurological:  Negative for syncope and light-headedness.         Objective:      /68 (BP Location: Left arm, Patient Position: Sitting, Cuff Size: Standard)   Ht 5' 4\" (1.626 m)   Wt 77.6 kg (171 lb)   LMP 01/18/2024   BMI 29.35 kg/m²          Physical Exam  Chest:   Breasts:     Breasts are symmetrical.      Right: No inverted nipple, mass, nipple discharge, skin change or tenderness.      Left: No inverted nipple, mass, nipple discharge, skin change or tenderness.   Genitourinary:     Labia:         Right: No rash, tenderness, lesion or injury.         Left: No rash, tenderness, lesion or injury.       Vagina: Normal. No vaginal discharge, erythema, tenderness or bleeding.      Cervix: No cervical motion tenderness, discharge, friability, erythema or cervical bleeding.      Uterus: Not deviated, not enlarged, not fixed and not tender.       Adnexa:         Right: No mass, tenderness or fullness.          Left: No mass, " tenderness or fullness.     Neurological:      Mental Status: She is alert and oriented to person, place, and time.

## 2024-02-21 PROBLEM — Z01.419 ENCOUNTER FOR ANNUAL ROUTINE GYNECOLOGICAL EXAMINATION: Status: RESOLVED | Noted: 2023-01-16 | Resolved: 2024-02-21

## 2024-03-28 NOTE — TELEPHONE ENCOUNTER
Patient is aware  Understands she may eat a light breakfast  Advised to call with any signs of labor  Decreased fetal movement,vb ctx  Patient verbalizes understanding  Single

## 2024-05-23 ENCOUNTER — TELEPHONE (OUTPATIENT)
Age: 34
End: 2024-05-23

## 2024-07-19 ENCOUNTER — OFFICE VISIT (OUTPATIENT)
Age: 34
End: 2024-07-19
Payer: COMMERCIAL

## 2024-07-19 VITALS
WEIGHT: 170.8 LBS | SYSTOLIC BLOOD PRESSURE: 108 MMHG | OXYGEN SATURATION: 92 % | HEART RATE: 79 BPM | RESPIRATION RATE: 16 BRPM | TEMPERATURE: 98 F | BODY MASS INDEX: 29.16 KG/M2 | HEIGHT: 64 IN | DIASTOLIC BLOOD PRESSURE: 66 MMHG

## 2024-07-19 DIAGNOSIS — J01.10 ACUTE NON-RECURRENT FRONTAL SINUSITIS: Primary | ICD-10-CM

## 2024-07-19 LAB
SARS-COV-2 AG UPPER RESP QL IA: NEGATIVE
VALID CONTROL: NORMAL

## 2024-07-19 PROCEDURE — 99214 OFFICE O/P EST MOD 30 MIN: CPT

## 2024-07-19 PROCEDURE — 87811 SARS-COV-2 COVID19 W/OPTIC: CPT

## 2024-07-19 RX ORDER — AMOXICILLIN AND CLAVULANATE POTASSIUM 875; 125 MG/1; MG/1
1 TABLET, FILM COATED ORAL EVERY 12 HOURS SCHEDULED
Qty: 10 TABLET | Refills: 0 | Status: SHIPPED | OUTPATIENT
Start: 2024-07-24 | End: 2024-07-29

## 2024-07-19 RX ORDER — FLUTICASONE PROPIONATE 50 MCG
1 SPRAY, SUSPENSION (ML) NASAL DAILY
Qty: 9.9 ML | Refills: 1 | Status: SHIPPED | OUTPATIENT
Start: 2024-07-19

## 2024-07-19 NOTE — PROGRESS NOTES
Ambulatory Visit  Name: Nel Herrera      : 1990      MRN: [id number]  Encounter Provider: Nini Luke PA-C  Encounter Date: 2024   Encounter department: [facility] [place]    Assessment & Plan   1. Acute non-recurrent frontal sinusitis  -     fluticasone (FLONASE) 50 mcg/act nasal spray; 1 spray into each nostril daily  -     amoxicillin-clavulanate (AUGMENTIN) 875-125 mg per tablet; Take 1 tablet by mouth every 12 (twelve) hours for 5 days Do not start before 2024.  -     POCT Rapid Covid Ag    Likely viral sinusitis.  Centor 0/4, low suspicion for strep.  Reviewed supportive measures.  Discussed using Flonase and the proper technique.  Continue with Mucinex and plenty of fluids/rest.  Tylenol or motrin for aches.  Discussed viral vs. Bacterial infections.  If still having severe symptoms next week, can  Augmentin and take twice daily for five days.      Depression Screening and Follow-up Plan: Patient was screened for depression during today's encounter. They screened negative with a PHQ-2 score of 0.      History of Present Illness     HPI    Pt started having symptoms two days ago with dry cough, sore throat, chest tightness from the coughing fits, hoarse voice.  Started with a productive cough yesterday of small amount of phlegm.  Denies fever, chills, N/V/D/C.  No sick contacts.  Denies myalgias.  Pt tried mucinex today, which did help loosen the phlegm.      Review of Systems   Constitutional:  Negative for chills, diaphoresis and fever.   HENT:  Positive for congestion, postnasal drip, rhinorrhea, sinus pressure, sinus pain, sore throat and voice change. Negative for ear pain and trouble swallowing.    Respiratory:  Negative for cough and shortness of breath.    Cardiovascular:  Negative for chest pain.   Gastrointestinal: Negative.    Genitourinary: Negative.    Musculoskeletal:  Negative for gait problem.   Skin:  Negative for rash.  "  Neurological:  Positive for headaches. Negative for syncope and light-headedness.   All other systems reviewed and are negative.      Objective     /66 (BP Location: Left arm, Patient Position: Sitting, Cuff Size: Standard)   Pulse 79   Temp 98 °F (36.7 °C) (Tympanic)   Resp 16   Ht 5' 4\" (1.626 m)   Wt 77.5 kg (170 lb 12.8 oz)   SpO2 92%   BMI 29.32 kg/m²     Physical Exam  Vitals and nursing note reviewed.   Constitutional:       General: She is not in acute distress.     Appearance: Normal appearance.   HENT:      Head: Normocephalic and atraumatic.      Right Ear: Hearing, ear canal and external ear normal. A middle ear effusion is present. Tympanic membrane is not erythematous.      Left Ear: Hearing, ear canal and external ear normal. A middle ear effusion is present. Tympanic membrane is not erythematous.      Nose: Congestion and rhinorrhea present. Rhinorrhea is clear.      Right Turbinates: Swollen.      Left Turbinates: Not swollen.      Right Sinus: Maxillary sinus tenderness and frontal sinus tenderness present.      Left Sinus: Maxillary sinus tenderness and frontal sinus tenderness present.      Mouth/Throat:      Lips: Pink.      Mouth: Mucous membranes are moist.      Tongue: No lesions.      Palate: No mass.      Pharynx: Uvula midline. Posterior oropharyngeal erythema and postnasal drip present. No pharyngeal swelling, oropharyngeal exudate or uvula swelling.      Tonsils: No tonsillar exudate or tonsillar abscesses.   Eyes:      General: Lids are normal. Vision grossly intact.      Conjunctiva/sclera: Conjunctivae normal.      Pupils: Pupils are equal, round, and reactive to light.   Neck:      Thyroid: No thyroid mass, thyromegaly or thyroid tenderness.   Cardiovascular:      Rate and Rhythm: Normal rate and regular rhythm.      Pulses:           Radial pulses are 2+ on the right side and 2+ on the left side.      Heart sounds: Normal heart sounds.   Pulmonary:      Effort: " Pulmonary effort is normal. No respiratory distress.      Breath sounds: Normal breath sounds.   Musculoskeletal:         General: No tenderness, deformity or signs of injury.      Cervical back: Full passive range of motion without pain.   Lymphadenopathy:      Cervical: No cervical adenopathy.   Skin:     General: Skin is warm and dry.      Capillary Refill: Capillary refill takes less than 2 seconds.      Coloration: Skin is not jaundiced.   Neurological:      Mental Status: She is alert and oriented to person, place, and time.       Administrative Statements

## 2024-11-27 ENCOUNTER — OFFICE VISIT (OUTPATIENT)
Dept: URGENT CARE | Facility: CLINIC | Age: 34
End: 2024-11-27
Payer: COMMERCIAL

## 2024-11-27 VITALS
OXYGEN SATURATION: 98 % | HEART RATE: 97 BPM | DIASTOLIC BLOOD PRESSURE: 80 MMHG | BODY MASS INDEX: 29.35 KG/M2 | WEIGHT: 171 LBS | RESPIRATION RATE: 18 BRPM | SYSTOLIC BLOOD PRESSURE: 122 MMHG | TEMPERATURE: 97.5 F

## 2024-11-27 DIAGNOSIS — J02.0 STREP PHARYNGITIS: Primary | ICD-10-CM

## 2024-11-27 LAB — S PYO AG THROAT QL: POSITIVE

## 2024-11-27 PROCEDURE — G0383 LEV 4 HOSP TYPE B ED VISIT: HCPCS | Performed by: PHYSICIAN ASSISTANT

## 2024-11-27 PROCEDURE — 87880 STREP A ASSAY W/OPTIC: CPT | Performed by: PHYSICIAN ASSISTANT

## 2024-11-27 RX ORDER — AMOXICILLIN 500 MG/1
500 CAPSULE ORAL EVERY 12 HOURS SCHEDULED
Qty: 20 CAPSULE | Refills: 0 | Status: SHIPPED | OUTPATIENT
Start: 2024-11-27 | End: 2024-12-07

## 2024-11-27 NOTE — PROGRESS NOTES
[facility]        NAME: Nel Herrera is a 33 y.o. female  : 1990    MRN: [id number]  DATE: 2024  TIME: 10:45 AM      Assessment and Plan     Strep pharyngitis [J02.0]  1. Strep pharyngitis  POCT rapid ANTIGEN strepA    amoxicillin (AMOXIL) 500 mg capsule          POC Testing Results    Rapid strep -- positive     Note:   Rx antibiotics for positive strep     Patient Instructions   There are no Patient Instructions on file for this visit.     Follow up with primary care provider.   Go to ER if symptoms worsen.    Chief Complaint     Chief Complaint   Patient presents with    Cold Like Symptoms     Pt here for chills cough nasal congestion and bodyaches that started yesterday          History of Present Illness     Patient presents with sore throat, nasal congestion, chills, body aches, diarrhea, nausea, and vomiting x yesterday morning. She has been taking Mucinex and Renae Onawa with mild relief. She states her son had strep last week.         Review of Systems     Review of Systems   Constitutional:  Positive for chills. Negative for fatigue and fever.   HENT:  Positive for congestion and sore throat. Negative for ear pain, postnasal drip, rhinorrhea, sinus pressure and sinus pain.    Eyes:  Negative for pain and visual disturbance.   Respiratory:  Negative for cough, chest tightness and shortness of breath.    Cardiovascular:  Negative for chest pain and palpitations.   Gastrointestinal:  Positive for diarrhea and nausea. Negative for abdominal pain and vomiting.   Genitourinary:  Negative for dysuria and hematuria.   Musculoskeletal:  Positive for myalgias. Negative for arthralgias and back pain.   Skin:  Negative for rash.   Neurological:  Negative for dizziness, seizures, syncope, numbness and headaches.   All other systems reviewed and are negative.        Current Medications       Current Outpatient Medications:     amoxicillin (AMOXIL) 500 mg capsule, Take 1 capsule  (500 mg total) by mouth every 12 (twelve) hours for 10 days, Disp: 20 capsule, Rfl: 0    BIOTIN PO, Take by mouth, Disp: , Rfl:     Cholecalciferol 10 MCG (400 UNIT) CHEW, Chew, Disp: , Rfl:     COLLAGEN PO, Use, Disp: , Rfl:     fluticasone (FLONASE) 50 mcg/act nasal spray, 1 spray into each nostril daily, Disp: 9.9 mL, Rfl: 1    Levothyroxine Sodium (SYNTHROID PO), Take 150 mg by mouth daily, Disp: , Rfl:     phentermine (ADIPEX-P) 37.5 MG tablet, Take 37.5 mg by mouth, Disp: , Rfl:     selenium 50 MCG TABS, Take 50 mcg by mouth daily, Disp: , Rfl:     albuterol (ProAir HFA) 90 mcg/act inhaler, Inhale 2 puffs every 6 (six) hours as needed for wheezing (Patient not taking: Reported on 11/27/2024), Disp: 8.5 g, Rfl: 0    cetirizine (ZyrTEC) 10 mg tablet, Take 1 tablet (10 mg total) by mouth daily for 10 days (Patient not taking: Reported on 1/29/2024), Disp: 10 tablet, Rfl: 0    predniSONE 20 mg tablet, 3 tabs my mouth x 5 days, then 2 tabs my mouth x 5 days, then 1 tab my mouth x 5 days. (Patient not taking: Reported on 11/27/2024), Disp: 30 tablet, Rfl: 0    Current Allergies     Allergies as of 11/27/2024    (No Known Allergies)              The following portions of the patient's history were reviewed and updated as appropriate: allergies, current medications, past family history, past medical history, past social history, past surgical history, and problem list.     Past Medical History:   Diagnosis Date    Migraine     Normal delivery     2014 son    Sciatica     Thyroid disease     Thyroid dysfunction in pregnancy, antepartum     last assessed 09/02/14    Varicella        Past Surgical History:   Procedure Laterality Date    TOOTH EXTRACTION         Family History   Problem Relation Age of Onset    Hypertension Mother     No Known Problems Father     Arthritis Sister     No Known Problems Brother     No Known Problems Paternal Grandmother     No Known Problems Son     Kidney disease Maternal Uncle     Anemia  Family     Arthritis Family     Diabetes Family     Hypertension Family     Kidney disease Family     Heart disease Family     Breast cancer Neg Hx     Ovarian cancer Neg Hx     Uterine cancer Neg Hx     Cervical cancer Neg Hx     Colon cancer Neg Hx          Medications have been verified.        Objective     /80   Pulse 97   Temp 97.5 °F (36.4 °C) (Tympanic)   Resp 18   Wt 77.6 kg (171 lb)   SpO2 98%   BMI 29.35 kg/m²   No LMP recorded.         Physical Exam     Physical Exam  Vitals and nursing note reviewed.   Constitutional:       Appearance: Normal appearance. She is normal weight.   HENT:      Head: Normocephalic and atraumatic.      Right Ear: Tympanic membrane, ear canal and external ear normal.      Left Ear: Tympanic membrane, ear canal and external ear normal.      Nose: Nose normal.      Mouth/Throat:      Mouth: Mucous membranes are moist.      Pharynx: Posterior oropharyngeal erythema (mild) present.      Comments: Tonsils normal bilaterally   Cardiovascular:      Rate and Rhythm: Normal rate and regular rhythm.      Heart sounds: Normal heart sounds.   Pulmonary:      Effort: Pulmonary effort is normal.      Breath sounds: Normal breath sounds.   Skin:     General: Skin is warm and dry.   Neurological:      General: No focal deficit present.      Mental Status: She is alert and oriented to person, place, and time.   Psychiatric:         Mood and Affect: Mood normal.         Behavior: Behavior normal.

## 2025-02-10 ENCOUNTER — ANNUAL EXAM (OUTPATIENT)
Dept: OBGYN CLINIC | Facility: CLINIC | Age: 35
End: 2025-02-10

## 2025-02-10 VITALS
DIASTOLIC BLOOD PRESSURE: 70 MMHG | SYSTOLIC BLOOD PRESSURE: 120 MMHG | WEIGHT: 174 LBS | HEIGHT: 64 IN | BODY MASS INDEX: 29.71 KG/M2

## 2025-02-10 DIAGNOSIS — Z01.419 ENCOUNTER FOR GYNECOLOGICAL EXAMINATION: Primary | ICD-10-CM

## 2025-02-10 PROCEDURE — 99395 PREV VISIT EST AGE 18-39: CPT | Performed by: OBSTETRICS & GYNECOLOGY

## 2025-02-10 RX ORDER — TOPIRAMATE 50 MG/1
50 TABLET, FILM COATED ORAL DAILY
COMMUNITY
Start: 2025-01-16

## 2025-02-10 NOTE — PROGRESS NOTES
Name: Nel Herrera      : 1990      MRN: [id number]  Encounter Provider: Rosy Delong MD  Encounter Date: 2/10/2025   Encounter department: [facility] [place]  :  Assessment & Plan  Encounter for gynecological examination  Pap/HPV current      Encourage healthy diet, exercise, Calcium 1200mg per day and at least 800 iu Vitamin D daily.             LMP 2025  Sexually active yes  Monogamous   Birth control none  has vasectomy   History of abnormal pap: no  Last pap 23 , Findings neg neg  , Next pap: 5 years   Self breast exam  no     Hereditary Cancer Screening  FH Breast/Ovarian cancer: none  FH Uterine cancer: none  FH Colon cancer: none  Cancer-related family history is negative for Breast cancer.    Past Medical History:   Diagnosis Date    Migraine     Normal delivery     2014 son    Sciatica     Thyroid disease     Thyroid dysfunction in pregnancy, antepartum     last assessed 14    Varicella          Past Surgical History:   Procedure Laterality Date    TOOTH EXTRACTION             Current Outpatient Medications:     BIOTIN PO, Take by mouth, Disp: , Rfl:     Levothyroxine Sodium (SYNTHROID PO), Take 150 mg by mouth daily, Disp: , Rfl:     phentermine (ADIPEX-P) 37.5 MG tablet, Take 37.5 mg by mouth, Disp: , Rfl:     topiramate (TOPAMAX) 50 MG tablet, Take 50 mg by mouth daily, Disp: , Rfl:     albuterol (ProAir HFA) 90 mcg/act inhaler, Inhale 2 puffs every 6 (six) hours as needed for wheezing (Patient not taking: Reported on 2/10/2025), Disp: 8.5 g, Rfl: 0    cetirizine (ZyrTEC) 10 mg tablet, Take 1 tablet (10 mg total) by mouth daily for 10 days (Patient not taking: Reported on 2024), Disp: 10 tablet, Rfl: 0    Cholecalciferol 10 MCG (400 UNIT) CHEW, Chew (Patient not taking: Reported on 2/10/2025), Disp: , Rfl:     COLLAGEN PO, Use (Patient not taking: Reported on 2/10/2025), Disp: , Rfl:     fluticasone (FLONASE) 50  mcg/act nasal spray, 1 spray into each nostril daily (Patient not taking: Reported on 2/10/2025), Disp: 9.9 mL, Rfl: 1    predniSONE 20 mg tablet, 3 tabs my mouth x 5 days, then 2 tabs my mouth x 5 days, then 1 tab my mouth x 5 days. (Patient not taking: Reported on 2024), Disp: 30 tablet, Rfl: 0    selenium 50 MCG TABS, Take 50 mcg by mouth daily (Patient not taking: Reported on 2/10/2025), Disp: , Rfl:       OB History          2    Para   2    Term   2            AB        Living   2         SAB        IAB        Ectopic        Multiple   0    Live Births   2           Obstetric Comments   Breech presentation  Pregnancy related leg pain, antepartum  Suspected problem with fetal growth not found    Menarche: 11 years                Social History     Tobacco Use    Smoking status: Never    Smokeless tobacco: Never   Vaping Use    Vaping status: Never Used   Substance Use Topics    Alcohol use: Yes     Comment: Socially    Drug use: No            History of Present Illness   Menses regular cycle - some changes in amount of bleeding, cramps, migraines.   Has been on weight loss medication- some up and down of weight over the last year    Gynecologic Exam  She reports no genital itching, genital lesions, genital odor, genital rash, pelvic pain, vaginal bleeding or vaginal discharge. Pertinent negatives include no chills, constipation, diarrhea, fever, nausea, sore throat or vomiting.     Nel Herrera is a 34 y.o. female who presents annual      Review of Systems   Constitutional:  Negative for activity change, appetite change, chills, fatigue and fever.   HENT:  Negative for rhinorrhea, sneezing and sore throat.    Eyes:  Negative for visual disturbance.   Respiratory:  Negative for cough, shortness of breath and wheezing.    Cardiovascular:  Negative for chest pain, palpitations and leg swelling.   Gastrointestinal:  Negative for abdominal distention, constipation, diarrhea, nausea and  "vomiting.   Genitourinary:  Negative for difficulty urinating, pelvic pain and vaginal discharge.   Neurological:  Negative for syncope and light-headedness.          Objective   /70 (BP Location: Left arm, Patient Position: Sitting, Cuff Size: Standard)   Ht 5' 4\" (1.626 m)   Wt 78.9 kg (174 lb)   LMP 01/23/2025   BMI 29.87 kg/m²      Physical Exam  Chest:   Breasts:     Breasts are symmetrical.      Right: No inverted nipple, mass, nipple discharge, skin change or tenderness.      Left: No inverted nipple, mass, nipple discharge, skin change or tenderness.   Genitourinary:     Labia:         Right: No rash, tenderness, lesion or injury.         Left: No rash, tenderness, lesion or injury.       Vagina: Normal. No vaginal discharge, erythema, tenderness or bleeding.      Cervix: No cervical motion tenderness, discharge, friability, erythema or cervical bleeding.      Uterus: Not deviated, not enlarged, not fixed and not tender.       Adnexa:         Right: No mass, tenderness or fullness.          Left: No mass, tenderness or fullness.     Neurological:      Mental Status: She is alert and oriented to person, place, and time.           "

## 2025-03-06 ENCOUNTER — OFFICE VISIT (OUTPATIENT)
Age: 35
End: 2025-03-06

## 2025-03-06 VITALS
OXYGEN SATURATION: 97 % | DIASTOLIC BLOOD PRESSURE: 68 MMHG | SYSTOLIC BLOOD PRESSURE: 110 MMHG | WEIGHT: 172 LBS | HEART RATE: 86 BPM | BODY MASS INDEX: 29.37 KG/M2 | HEIGHT: 64 IN | TEMPERATURE: 97.8 F

## 2025-03-06 DIAGNOSIS — E03.9 ACQUIRED HYPOTHYROIDISM: Primary | ICD-10-CM

## 2025-03-06 PROCEDURE — 99204 OFFICE O/P NEW MOD 45 MIN: CPT

## 2025-03-06 RX ORDER — LEVOTHYROXINE SODIUM 150 UG/1
TABLET ORAL
Qty: 90 TABLET | Refills: 1 | Status: SHIPPED | OUTPATIENT
Start: 2025-03-06

## 2025-03-06 RX ORDER — LEVOTHYROXINE SODIUM 150 UG/1
TABLET ORAL
Qty: 90 TABLET | Refills: 1 | Status: SHIPPED | OUTPATIENT
Start: 2025-03-06 | End: 2025-03-06 | Stop reason: SDUPTHER

## 2025-03-06 NOTE — PROGRESS NOTES
Name: Nel Herrera      : 1990      MRN: [id number]  Encounter Provider: DANNY Hebert  Encounter Date: 3/6/2025   Encounter department: [facility] [place]  :  Assessment & Plan  Acquired hypothyroidism  Presents clinically and biochemically euthyroid.    TSH at goal for age.  No plans for pregnancy.  Continue Synthroid 150 mcg x 6 days a week.  She reports she takes this medication consistently and correctly.  Repeat thyroid function tests prior to next visit, or sooner if symptomatic.    Orders:  •  TSH, 3rd generation; Future  •  T4, free; Future  •  levothyroxine (Synthroid) 150 mcg tablet; Take 1 tablet by mouth Monday-Saturday. No tablet on     BMI 29.0-29.9,adult  Weight has been stable in past year.    She is currently taking Topamax 50 mg daily and phentermine 37.5 mg daily.  Reviewed phentermine and Topamax combination is not recommended for long-term management of weight loss.  Encouraged to take 3-month hiatus after current prescription expires.  Discussed other weight loss options including GLP-1 agonist injectables.  She wishes to defer at this time.    Discussed continuing to follow a balanced diet, avoiding processed foods and sweetened beverages, and staying well-hydrated.  Discussed importance of counting calories and tracking macros.  Optimize protein intake.  Discussed weight on scale is not always indicative of overall health as muscle weighs more than fat.  Discussed prioritizing weightbearing exercises over cardiovascular exercises to continue to support bone density.     24 14:02 25 11:15   Weight - Scale 77.6 kg (171 lb) 78 kg (172 lb)              History of Present Illness {?Quick Links Encounters * My Last Note * Last Note in Specialty * Snapshot * Since Last Visit * History :01017}  HPI  Nel Herrera is a 34 y.o. female who presents to the office today to establish care for acquired  hypothyroidism.  She was initially diagnosed around age 17 when she was complaining of excessive fatigue requiring naps with no later disruptions in overnight sleep, gaining weight easily/ difficulty losing weight, hair loss, dry skin. She was also complaining of frequent diarrhea. She was then started on LT4 supplementation by her primary care provider and recalls losing weight right away.    She is currently taking phentermine 37.5 mg daily.  This was started summer 2023 by [facility] endocrinology.  In 11/2023 she complained of significant acne at which time phentermine was stopped and acne improved.  She regained weight at that time.  In 4/2024, she willing to retry phentermine for which it was represcribed. Weight loss goal is 160 lbs. She is currently exercising 5 days a week with cardio/ weight lifiting / HIIT. She follows a balanced a diet, drinks 80+ oz of water, does not track macros or calories.    Reports mostly regular menstrual cycles, no birth control, minimal acne, no complaints of hirsutism.    She was previously following with [facility] and was last seen via telemedicine 11/21/2024.    No history of external radiation to head/neck/chest.  No recent iodine loading in form of medication, biotin or kelp supplements, or radiological diagnostic studies.      Current Medication Regimen:   Synthroid 175 mcg Monday through Saturday,  No tablet on [place]    1/15/2025:   TSH- 2.6 (0.45-4.5)  Free T4- 1.68 ( 0.82-1.77)      History obtained from: patient    Review of Systems   Constitutional:  Positive for fatigue. Negative for unexpected weight change.   HENT:  Negative for trouble swallowing and voice change.    Eyes:  Negative for visual disturbance.   Cardiovascular:  Negative for palpitations.   Gastrointestinal:  Negative for constipation and diarrhea.   Endocrine: Negative for cold intolerance and heat intolerance.        Chronic hair loss   Genitourinary:  Negative for menstrual problem.    Neurological:  Negative for weakness.        Parasthesias   Psychiatric/Behavioral:  Positive for sleep disturbance. The patient is nervous/anxious.      Current Outpatient Medications on File Prior to Visit   Medication Sig Dispense Refill   • phentermine (ADIPEX-P) 37.5 MG tablet Take 37.5 mg by mouth     • topiramate (TOPAMAX) 50 MG tablet Take 50 mg by mouth daily     • [DISCONTINUED] Levothyroxine Sodium (SYNTHROID PO) Take 150 mg by mouth daily     • [DISCONTINUED] albuterol (ProAir HFA) 90 mcg/act inhaler Inhale 2 puffs every 6 (six) hours as needed for wheezing (Patient not taking: Reported on 3/6/2025) 8.5 g 0   • [DISCONTINUED] BIOTIN PO Take by mouth (Patient not taking: Reported on 3/6/2025)     • [DISCONTINUED] cetirizine (ZyrTEC) 10 mg tablet Take 1 tablet (10 mg total) by mouth daily for 10 days (Patient not taking: Reported on 3/6/2025) 10 tablet 0   • [DISCONTINUED] Cholecalciferol 10 MCG (400 UNIT) CHEW Chew (Patient not taking: Reported on 3/6/2025)     • [DISCONTINUED] COLLAGEN PO Use (Patient not taking: Reported on 3/6/2025)     • [DISCONTINUED] fluticasone (FLONASE) 50 mcg/act nasal spray 1 spray into each nostril daily (Patient not taking: Reported on 3/6/2025) 9.9 mL 1   • [DISCONTINUED] predniSONE 20 mg tablet 3 tabs my mouth x 5 days, then 2 tabs my mouth x 5 days, then 1 tab my mouth x 5 days. (Patient not taking: Reported on 3/6/2025) 30 tablet 0   • [DISCONTINUED] selenium 50 MCG TABS Take 50 mcg by mouth daily (Patient not taking: Reported on 2/10/2025)       No current facility-administered medications on file prior to visit.      Social History     Tobacco Use   • Smoking status: Never   • Smokeless tobacco: Never   Vaping Use   • Vaping status: Never Used   Substance and Sexual Activity   • Alcohol use: Yes     Comment: Socially   • Drug use: No   • Sexual activity: Yes     Partners: Male     Birth control/protection: None     Comment:         Objective {?Quick Links Trend  "Vitals * Enter New Vitals * Results Review * Timeline (Adult) * Labs * Imaging * Cardiology * Procedures * Lung Cancer Screening * Surgical eConsent :00786}  /68 (BP Location: Left arm, Patient Position: Sitting, Cuff Size: Standard)   Pulse 86   Temp 97.8 °F (36.6 °C)   Ht 5' 4\" (1.626 m)   Wt 78 kg (172 lb)   LMP 01/23/2025   SpO2 97%   BMI 29.52 kg/m²      Physical Exam  Vitals reviewed.   Constitutional:       General: She is not in acute distress.     Appearance: She is well-developed.   HENT:      Head: Normocephalic and atraumatic.      Mouth/Throat:      Mouth: Mucous membranes are moist.   Eyes:      Conjunctiva/sclera: Conjunctivae normal.   Neck:      Thyroid: Thyromegaly present. No thyroid mass or thyroid tenderness.   Cardiovascular:      Rate and Rhythm: Normal rate.   Pulmonary:      Effort: Pulmonary effort is normal. No respiratory distress.   Abdominal:      Palpations: Abdomen is soft.      Tenderness: There is no abdominal tenderness.   Musculoskeletal:         General: No swelling.      Cervical back: Normal range of motion.   Skin:     General: Skin is warm and dry.      Capillary Refill: Capillary refill takes less than 2 seconds.   Neurological:      General: No focal deficit present.      Mental Status: She is alert and oriented to person, place, and time.   Psychiatric:         Mood and Affect: Mood normal.         Behavior: Behavior normal.         Thought Content: Thought content normal.         Judgment: Judgment normal.         Administrative Statements {?Quick Links Full Problem List * Level of Service * [facility]}  I have spent a total time of 45 minutes in caring for this patient on the day of the visit/encounter including Diagnostic results, Prognosis, Risks and benefits of tx options, Instructions for management, Patient and family education, Importance of tx compliance, Risk factor reductions, Impressions, Counseling / Coordination of care, Documenting in the " medical record, Reviewing/placing orders in the medical record (including tests, medications, and/or procedures), and Obtaining or reviewing history  .

## 2025-03-06 NOTE — ASSESSMENT & PLAN NOTE
Weight has been stable in past year.    She is currently taking Topamax 50 mg daily and phentermine 37.5 mg daily.  Reviewed phentermine and Topamax combination is not recommended for long-term management of weight loss.  Encouraged to take 3-month hiatus after current prescription expires.  Discussed other weight loss options including GLP-1 agonist injectables.  She wishes to defer at this time.    Discussed continuing to follow a balanced diet, avoiding processed foods and sweetened beverages, and staying well-hydrated.  Discussed importance of counting calories and tracking macros.  Optimize protein intake.  Discussed weight on scale is not always indicative of overall health as muscle weighs more than fat.  Discussed prioritizing weightbearing exercises over cardiovascular exercises to continue to support bone density.     01/29/24 14:02 03/06/25 11:15   Weight - Scale 77.6 kg (171 lb) 78 kg (172 lb)

## 2025-03-06 NOTE — ASSESSMENT & PLAN NOTE
Presents clinically and biochemically euthyroid.    TSH at goal for age.  No plans for pregnancy.  Continue Synthroid 150 mcg x 6 days a week.  She reports she takes this medication consistently and correctly.  Repeat thyroid function tests prior to next visit, or sooner if symptomatic.    Orders:  •  TSH, 3rd generation; Future  •  T4, free; Future  •  levothyroxine (Synthroid) 150 mcg tablet; Take 1 tablet by mouth Monday-Saturday. No tablet on Sunday

## 2025-03-06 NOTE — PATIENT INSTRUCTIONS
Continue Synthroid 150 mcg Monday-Saturday with no tablet on Sunday  Labs prior to next visit   Take on an empty stomach, with only water. Wait approximately 30-60 minutes before consuming anything else besides water.  Medications like multivitamin, iron, calcium, tums, or antacids should be taken approximately 4 hrs apart from Levothyroxine as they may decrease its absorption.  If you miss a dose, you can take 2 the following days  Take a break from Topamx/ phentermine when prescription is complete and we can re-evaluate  Look into sleep hygiene

## 2025-05-13 RX ORDER — PHENTERMINE HYDROCHLORIDE 37.5 MG/1
37.5 TABLET ORAL
Refills: 0 | OUTPATIENT
Start: 2025-05-13

## 2025-05-22 ENCOUNTER — PATIENT MESSAGE (OUTPATIENT)
Age: 35
End: 2025-05-22

## 2025-06-02 RX ORDER — PHENTERMINE HYDROCHLORIDE 37.5 MG/1
37.5 TABLET ORAL
Refills: 0 | OUTPATIENT
Start: 2025-06-02

## 2025-06-02 RX ORDER — PHENTERMINE HYDROCHLORIDE 37.5 MG/1
37.5 TABLET ORAL DAILY
Qty: 30 TABLET | Refills: 2 | Status: SHIPPED | OUTPATIENT
Start: 2025-06-02

## 2025-06-02 NOTE — TELEPHONE ENCOUNTER
Looks like this is being managed by Endocrinology and last ordered by them. Please have patient reach out to their office